# Patient Record
Sex: MALE | Race: WHITE | Employment: FULL TIME | URBAN - METROPOLITAN AREA
[De-identification: names, ages, dates, MRNs, and addresses within clinical notes are randomized per-mention and may not be internally consistent; named-entity substitution may affect disease eponyms.]

---

## 2024-11-07 ENCOUNTER — APPOINTMENT (OUTPATIENT)
Dept: CT IMAGING | Age: 29
End: 2024-11-07
Payer: COMMERCIAL

## 2024-11-07 ENCOUNTER — HOSPITAL ENCOUNTER (INPATIENT)
Age: 29
LOS: 8 days | Discharge: NURSING, REHAB OR LONG-TERM CARE | End: 2024-11-15
Attending: STUDENT IN AN ORGANIZED HEALTH CARE EDUCATION/TRAINING PROGRAM | Admitting: FAMILY MEDICINE
Payer: COMMERCIAL

## 2024-11-07 DIAGNOSIS — R56.9 SEIZURE (HCC): Primary | ICD-10-CM

## 2024-11-07 DIAGNOSIS — F10.939 ALCOHOL WITHDRAWAL SYNDROME WITH COMPLICATION (HCC): ICD-10-CM

## 2024-11-07 LAB
ALBUMIN SERPL-MCNC: 4.2 G/DL (ref 3.4–5)
ALBUMIN/GLOB SERPL: 1.6 {RATIO} (ref 1.1–2.2)
ALP SERPL-CCNC: 79 U/L (ref 40–129)
ALT SERPL-CCNC: 59 U/L (ref 10–40)
ANION GAP SERPL CALCULATED.3IONS-SCNC: 23 MMOL/L (ref 3–16)
APAP SERPL-MCNC: <5 UG/ML (ref 10–30)
AST SERPL-CCNC: 174 U/L (ref 15–37)
BASE EXCESS BLDV CALC-SCNC: -14.1 MMOL/L (ref -3–3)
BASOPHILS # BLD: 0 K/UL (ref 0–0.2)
BASOPHILS NFR BLD: 0.9 %
BILIRUB SERPL-MCNC: 0.7 MG/DL (ref 0–1)
BUN SERPL-MCNC: 7 MG/DL (ref 7–20)
CALCIUM SERPL-MCNC: 8.7 MG/DL (ref 8.3–10.6)
CHLORIDE SERPL-SCNC: 92 MMOL/L (ref 99–110)
CK SERPL-CCNC: 127 U/L (ref 39–308)
CO2 BLDV-SCNC: 11 MMOL/L
CO2 SERPL-SCNC: 19 MMOL/L (ref 21–32)
COHGB MFR BLDV: 1.3 % (ref 0–1.5)
CREAT SERPL-MCNC: 0.5 MG/DL (ref 0.9–1.3)
DEPRECATED RDW RBC AUTO: 14.4 % (ref 12.4–15.4)
EOSINOPHIL # BLD: 0 K/UL (ref 0–0.6)
EOSINOPHIL NFR BLD: 0.4 %
ETHANOLAMINE SERPL-MCNC: 43 MG/DL (ref 0–0.08)
GFR SERPLBLD CREATININE-BSD FMLA CKD-EPI: >90 ML/MIN/{1.73_M2}
GLUCOSE SERPL-MCNC: 120 MG/DL (ref 70–99)
HCO3 BLDV-SCNC: 10.7 MMOL/L (ref 23–29)
HCT VFR BLD AUTO: 36.9 % (ref 40.5–52.5)
HGB BLD-MCNC: 12.3 G/DL (ref 13.5–17.5)
LYMPHOCYTES # BLD: 0.8 K/UL (ref 1–5.1)
LYMPHOCYTES NFR BLD: 16.2 %
MAGNESIUM SERPL-MCNC: 1.45 MG/DL (ref 1.8–2.4)
MCH RBC QN AUTO: 30 PG (ref 26–34)
MCHC RBC AUTO-ENTMCNC: 33.4 G/DL (ref 31–36)
MCV RBC AUTO: 89.8 FL (ref 80–100)
METHGB MFR BLDV: 0.3 %
MONOCYTES # BLD: 0.5 K/UL (ref 0–1.3)
MONOCYTES NFR BLD: 9.6 %
NEUTROPHILS # BLD: 3.4 K/UL (ref 1.7–7.7)
NEUTROPHILS NFR BLD: 72.9 %
O2 CT VFR BLDV CALC: 19 VOL %
O2 THERAPY: ABNORMAL
PATH INTERP BLD-IMP: NO
PCO2 BLDV: 23.4 MMHG (ref 40–50)
PH BLDV: 7.28 [PH] (ref 7.35–7.45)
PLATELET # BLD AUTO: 73 K/UL (ref 135–450)
PLATELET BLD QL SMEAR: ABNORMAL
PMV BLD AUTO: 8.6 FL (ref 5–10.5)
PO2 BLDV: 91.3 MMHG (ref 25–40)
POTASSIUM SERPL-SCNC: 3 MMOL/L (ref 3.5–5.1)
PROT SERPL-MCNC: 6.9 G/DL (ref 6.4–8.2)
RBC # BLD AUTO: 4.12 M/UL (ref 4.2–5.9)
SALICYLATES SERPL-MCNC: 0.5 MG/DL (ref 15–30)
SAO2 % BLDV: 96 %
SLIDE REVIEW: ABNORMAL
SODIUM SERPL-SCNC: 134 MMOL/L (ref 136–145)
TSH SERPL DL<=0.005 MIU/L-ACNC: 0.9 UIU/ML (ref 0.27–4.2)
WBC # BLD AUTO: 4.7 K/UL (ref 4–11)

## 2024-11-07 PROCEDURE — 96375 TX/PRO/DX INJ NEW DRUG ADDON: CPT

## 2024-11-07 PROCEDURE — 82803 BLOOD GASES ANY COMBINATION: CPT

## 2024-11-07 PROCEDURE — 96365 THER/PROPH/DIAG IV INF INIT: CPT

## 2024-11-07 PROCEDURE — 6360000002 HC RX W HCPCS

## 2024-11-07 PROCEDURE — 70450 CT HEAD/BRAIN W/O DYE: CPT

## 2024-11-07 PROCEDURE — 82077 ASSAY SPEC XCP UR&BREATH IA: CPT

## 2024-11-07 PROCEDURE — 85025 COMPLETE CBC W/AUTO DIFF WBC: CPT

## 2024-11-07 PROCEDURE — 99285 EMERGENCY DEPT VISIT HI MDM: CPT

## 2024-11-07 PROCEDURE — 2580000003 HC RX 258: Performed by: STUDENT IN AN ORGANIZED HEALTH CARE EDUCATION/TRAINING PROGRAM

## 2024-11-07 PROCEDURE — 93005 ELECTROCARDIOGRAM TRACING: CPT | Performed by: STUDENT IN AN ORGANIZED HEALTH CARE EDUCATION/TRAINING PROGRAM

## 2024-11-07 PROCEDURE — 2000000000 HC ICU R&B

## 2024-11-07 PROCEDURE — 80143 DRUG ASSAY ACETAMINOPHEN: CPT

## 2024-11-07 PROCEDURE — 80179 DRUG ASSAY SALICYLATE: CPT

## 2024-11-07 PROCEDURE — 36415 COLL VENOUS BLD VENIPUNCTURE: CPT

## 2024-11-07 PROCEDURE — 80053 COMPREHEN METABOLIC PANEL: CPT

## 2024-11-07 PROCEDURE — 84443 ASSAY THYROID STIM HORMONE: CPT

## 2024-11-07 PROCEDURE — 82550 ASSAY OF CK (CPK): CPT

## 2024-11-07 PROCEDURE — 6360000002 HC RX W HCPCS: Performed by: STUDENT IN AN ORGANIZED HEALTH CARE EDUCATION/TRAINING PROGRAM

## 2024-11-07 PROCEDURE — 83735 ASSAY OF MAGNESIUM: CPT

## 2024-11-07 RX ORDER — POTASSIUM CHLORIDE 7.45 MG/ML
10 INJECTION INTRAVENOUS
Status: DISCONTINUED | OUTPATIENT
Start: 2024-11-07 | End: 2024-11-07

## 2024-11-07 RX ORDER — THIAMINE HYDROCHLORIDE 100 MG/ML
100 INJECTION, SOLUTION INTRAMUSCULAR; INTRAVENOUS ONCE
Status: COMPLETED | OUTPATIENT
Start: 2024-11-07 | End: 2024-11-07

## 2024-11-07 RX ORDER — POTASSIUM CHLORIDE 7.45 MG/ML
10 INJECTION INTRAVENOUS
Status: DISPENSED | OUTPATIENT
Start: 2024-11-07 | End: 2024-11-08

## 2024-11-07 RX ORDER — MAGNESIUM SULFATE IN WATER 40 MG/ML
2000 INJECTION, SOLUTION INTRAVENOUS ONCE
Status: COMPLETED | OUTPATIENT
Start: 2024-11-07 | End: 2024-11-08

## 2024-11-07 RX ORDER — PHENOBARBITAL SODIUM 65 MG/ML
260 INJECTION, SOLUTION INTRAMUSCULAR; INTRAVENOUS ONCE
Status: COMPLETED | OUTPATIENT
Start: 2024-11-07 | End: 2024-11-07

## 2024-11-07 RX ORDER — LORAZEPAM 2 MG/ML
2 INJECTION INTRAMUSCULAR ONCE
Status: COMPLETED | OUTPATIENT
Start: 2024-11-07 | End: 2024-11-07

## 2024-11-07 RX ORDER — LORAZEPAM 2 MG/ML
INJECTION INTRAMUSCULAR
Status: COMPLETED
Start: 2024-11-07 | End: 2024-11-07

## 2024-11-07 RX ADMIN — MAGNESIUM SULFATE HEPTAHYDRATE 2000 MG: 40 INJECTION, SOLUTION INTRAVENOUS at 22:08

## 2024-11-07 RX ADMIN — POTASSIUM CHLORIDE 10 MEQ: 29.8 INJECTION INTRAVENOUS at 23:20

## 2024-11-07 RX ADMIN — PHENOBARBITAL SODIUM 260 MG: 65 INJECTION INTRAMUSCULAR; INTRAVENOUS at 23:12

## 2024-11-07 RX ADMIN — LORAZEPAM 2 MG: 2 INJECTION INTRAMUSCULAR at 20:28

## 2024-11-07 RX ADMIN — LORAZEPAM 2 MG: 2 INJECTION INTRAMUSCULAR; INTRAVENOUS at 20:29

## 2024-11-07 RX ADMIN — LORAZEPAM 2 MG: 2 INJECTION INTRAMUSCULAR; INTRAVENOUS at 20:28

## 2024-11-07 RX ADMIN — THIAMINE HYDROCHLORIDE 100 MG: 100 INJECTION, SOLUTION INTRAMUSCULAR; INTRAVENOUS at 20:57

## 2024-11-07 RX ADMIN — LORAZEPAM 2 MG: 2 INJECTION INTRAMUSCULAR at 20:29

## 2024-11-07 ASSESSMENT — LIFESTYLE VARIABLES
HOW OFTEN DO YOU HAVE A DRINK CONTAINING ALCOHOL: 2-4 TIMES A MONTH
HOW MANY STANDARD DRINKS CONTAINING ALCOHOL DO YOU HAVE ON A TYPICAL DAY: 5 OR 6

## 2024-11-07 ASSESSMENT — PAIN - FUNCTIONAL ASSESSMENT: PAIN_FUNCTIONAL_ASSESSMENT: NONE - DENIES PAIN

## 2024-11-08 PROBLEM — F10.939 ALCOHOL WITHDRAWAL SYNDROME WITH COMPLICATION (HCC): Status: ACTIVE | Noted: 2024-11-08

## 2024-11-08 PROBLEM — D69.6 THROMBOCYTOPENIA (HCC): Status: ACTIVE | Noted: 2024-11-08

## 2024-11-08 PROBLEM — R79.89 ELEVATED LFTS: Status: ACTIVE | Noted: 2024-11-08

## 2024-11-08 LAB
AMPHETAMINES UR QL SCN>1000 NG/ML: NORMAL
ANION GAP SERPL CALCULATED.3IONS-SCNC: 15 MMOL/L (ref 3–16)
BARBITURATES UR QL SCN>200 NG/ML: NORMAL
BASOPHILS # BLD: 0 K/UL (ref 0–0.2)
BASOPHILS NFR BLD: 0.3 %
BENZODIAZ UR QL SCN>200 NG/ML: NORMAL
BUN SERPL-MCNC: 7 MG/DL (ref 7–20)
CALCIUM SERPL-MCNC: 8.2 MG/DL (ref 8.3–10.6)
CANNABINOIDS UR QL SCN>50 NG/ML: NORMAL
CHLORIDE SERPL-SCNC: 92 MMOL/L (ref 99–110)
CO2 SERPL-SCNC: 24 MMOL/L (ref 21–32)
COCAINE UR QL SCN: NORMAL
CREAT SERPL-MCNC: 0.4 MG/DL (ref 0.9–1.3)
DEPRECATED RDW RBC AUTO: 14.5 % (ref 12.4–15.4)
DRUG SCREEN COMMENT UR-IMP: NORMAL
EKG DIAGNOSIS: NORMAL
EKG Q-T INTERVAL: 282 MS
EKG QRS DURATION: 80 MS
EKG QTC CALCULATION (BAZETT): 411 MS
EKG R AXIS: 6 DEGREES
EKG T AXIS: 2 DEGREES
EKG VENTRICULAR RATE: 128 BPM
EOSINOPHIL # BLD: 0 K/UL (ref 0–0.6)
EOSINOPHIL NFR BLD: 0 %
FENTANYL SCREEN, URINE: NORMAL
GFR SERPLBLD CREATININE-BSD FMLA CKD-EPI: >90 ML/MIN/{1.73_M2}
GLUCOSE SERPL-MCNC: 83 MG/DL (ref 70–99)
HCT VFR BLD AUTO: 37.1 % (ref 40.5–52.5)
HGB BLD-MCNC: 12.6 G/DL (ref 13.5–17.5)
LYMPHOCYTES # BLD: 0.3 K/UL (ref 1–5.1)
LYMPHOCYTES NFR BLD: 7.3 %
MCH RBC QN AUTO: 30.1 PG (ref 26–34)
MCHC RBC AUTO-ENTMCNC: 34 G/DL (ref 31–36)
MCV RBC AUTO: 88.4 FL (ref 80–100)
METHADONE UR QL SCN>300 NG/ML: NORMAL
MONOCYTES # BLD: 0.4 K/UL (ref 0–1.3)
MONOCYTES NFR BLD: 8.7 %
NEUTROPHILS # BLD: 3.9 K/UL (ref 1.7–7.7)
NEUTROPHILS NFR BLD: 83.7 %
OPIATES UR QL SCN>300 NG/ML: NORMAL
OXYCODONE UR QL SCN: NORMAL
PCP UR QL SCN>25 NG/ML: NORMAL
PH UR STRIP: 5.5 [PH]
PLATELET # BLD AUTO: 56 K/UL (ref 135–450)
PMV BLD AUTO: 8.3 FL (ref 5–10.5)
POTASSIUM SERPL-SCNC: 3.3 MMOL/L (ref 3.5–5.1)
RBC # BLD AUTO: 4.2 M/UL (ref 4.2–5.9)
SODIUM SERPL-SCNC: 131 MMOL/L (ref 136–145)
WBC # BLD AUTO: 4.7 K/UL (ref 4–11)

## 2024-11-08 PROCEDURE — 6360000002 HC RX W HCPCS: Performed by: STUDENT IN AN ORGANIZED HEALTH CARE EDUCATION/TRAINING PROGRAM

## 2024-11-08 PROCEDURE — 6360000002 HC RX W HCPCS: Performed by: FAMILY MEDICINE

## 2024-11-08 PROCEDURE — 36415 COLL VENOUS BLD VENIPUNCTURE: CPT

## 2024-11-08 PROCEDURE — 95816 EEG AWAKE AND DROWSY: CPT

## 2024-11-08 PROCEDURE — 6370000000 HC RX 637 (ALT 250 FOR IP): Performed by: INTERNAL MEDICINE

## 2024-11-08 PROCEDURE — 2580000003 HC RX 258: Performed by: FAMILY MEDICINE

## 2024-11-08 PROCEDURE — 80048 BASIC METABOLIC PNL TOTAL CA: CPT

## 2024-11-08 PROCEDURE — 99223 1ST HOSP IP/OBS HIGH 75: CPT | Performed by: INTERNAL MEDICINE

## 2024-11-08 PROCEDURE — 2580000003 HC RX 258: Performed by: STUDENT IN AN ORGANIZED HEALTH CARE EDUCATION/TRAINING PROGRAM

## 2024-11-08 PROCEDURE — 6370000000 HC RX 637 (ALT 250 FOR IP): Performed by: FAMILY MEDICINE

## 2024-11-08 PROCEDURE — 99223 1ST HOSP IP/OBS HIGH 75: CPT | Performed by: PSYCHIATRY & NEUROLOGY

## 2024-11-08 PROCEDURE — 80307 DRUG TEST PRSMV CHEM ANLYZR: CPT

## 2024-11-08 PROCEDURE — 6370000000 HC RX 637 (ALT 250 FOR IP): Performed by: PSYCHIATRY & NEUROLOGY

## 2024-11-08 PROCEDURE — 85025 COMPLETE CBC W/AUTO DIFF WBC: CPT

## 2024-11-08 PROCEDURE — 93010 ELECTROCARDIOGRAM REPORT: CPT | Performed by: INTERNAL MEDICINE

## 2024-11-08 PROCEDURE — 2000000000 HC ICU R&B

## 2024-11-08 RX ORDER — SODIUM CHLORIDE 0.9 % (FLUSH) 0.9 %
5-40 SYRINGE (ML) INJECTION PRN
Status: DISCONTINUED | OUTPATIENT
Start: 2024-11-08 | End: 2024-11-08 | Stop reason: SDUPTHER

## 2024-11-08 RX ORDER — MUPIROCIN 20 MG/G
OINTMENT TOPICAL 2 TIMES DAILY
Status: DISCONTINUED | OUTPATIENT
Start: 2024-11-08 | End: 2024-11-13

## 2024-11-08 RX ORDER — ONDANSETRON 2 MG/ML
4 INJECTION INTRAMUSCULAR; INTRAVENOUS EVERY 6 HOURS PRN
Status: DISCONTINUED | OUTPATIENT
Start: 2024-11-08 | End: 2024-11-15 | Stop reason: HOSPADM

## 2024-11-08 RX ORDER — POTASSIUM CHLORIDE 29.8 MG/ML
20 INJECTION INTRAVENOUS PRN
Status: DISCONTINUED | OUTPATIENT
Start: 2024-11-08 | End: 2024-11-15 | Stop reason: HOSPADM

## 2024-11-08 RX ORDER — LORAZEPAM 2 MG/ML
4 INJECTION INTRAMUSCULAR
Status: DISCONTINUED | OUTPATIENT
Start: 2024-11-08 | End: 2024-11-15 | Stop reason: HOSPADM

## 2024-11-08 RX ORDER — ACETAMINOPHEN 325 MG/1
650 TABLET ORAL EVERY 6 HOURS PRN
Status: DISCONTINUED | OUTPATIENT
Start: 2024-11-08 | End: 2024-11-15 | Stop reason: HOSPADM

## 2024-11-08 RX ORDER — CHLORDIAZEPOXIDE HYDROCHLORIDE 25 MG/1
50 CAPSULE, GELATIN COATED ORAL EVERY 6 HOURS
Status: DISCONTINUED | OUTPATIENT
Start: 2024-11-09 | End: 2024-11-13

## 2024-11-08 RX ORDER — POTASSIUM CHLORIDE 7.45 MG/ML
10 INJECTION INTRAVENOUS PRN
Status: DISCONTINUED | OUTPATIENT
Start: 2024-11-08 | End: 2024-11-15 | Stop reason: HOSPADM

## 2024-11-08 RX ORDER — SODIUM CHLORIDE 0.9 % (FLUSH) 0.9 %
5-40 SYRINGE (ML) INJECTION EVERY 12 HOURS SCHEDULED
Status: DISCONTINUED | OUTPATIENT
Start: 2024-11-08 | End: 2024-11-08 | Stop reason: SDUPTHER

## 2024-11-08 RX ORDER — LANOLIN ALCOHOL/MO/W.PET/CERES
100 CREAM (GRAM) TOPICAL DAILY
Status: DISCONTINUED | OUTPATIENT
Start: 2024-11-08 | End: 2024-11-15 | Stop reason: HOSPADM

## 2024-11-08 RX ORDER — POTASSIUM CHLORIDE 750 MG/1
40 TABLET, EXTENDED RELEASE ORAL ONCE
Status: COMPLETED | OUTPATIENT
Start: 2024-11-08 | End: 2024-11-08

## 2024-11-08 RX ORDER — MAGNESIUM SULFATE IN WATER 40 MG/ML
2000 INJECTION, SOLUTION INTRAVENOUS PRN
Status: DISCONTINUED | OUTPATIENT
Start: 2024-11-08 | End: 2024-11-15 | Stop reason: HOSPADM

## 2024-11-08 RX ORDER — FOLIC ACID 1 MG/1
1 TABLET ORAL DAILY
Status: DISCONTINUED | OUTPATIENT
Start: 2024-11-08 | End: 2024-11-15 | Stop reason: HOSPADM

## 2024-11-08 RX ORDER — ACETAMINOPHEN 650 MG/1
650 SUPPOSITORY RECTAL EVERY 6 HOURS PRN
Status: DISCONTINUED | OUTPATIENT
Start: 2024-11-08 | End: 2024-11-15 | Stop reason: HOSPADM

## 2024-11-08 RX ORDER — SODIUM CHLORIDE 9 MG/ML
INJECTION, SOLUTION INTRAVENOUS CONTINUOUS
Status: DISCONTINUED | OUTPATIENT
Start: 2024-11-08 | End: 2024-11-08

## 2024-11-08 RX ORDER — MORPHINE SULFATE 2 MG/ML
2 INJECTION, SOLUTION INTRAMUSCULAR; INTRAVENOUS
Status: DISCONTINUED | OUTPATIENT
Start: 2024-11-08 | End: 2024-11-15 | Stop reason: HOSPADM

## 2024-11-08 RX ORDER — PREGABALIN 25 MG/1
75 CAPSULE ORAL 2 TIMES DAILY
Status: DISCONTINUED | OUTPATIENT
Start: 2024-11-08 | End: 2024-11-15 | Stop reason: HOSPADM

## 2024-11-08 RX ORDER — M-VIT,TX,IRON,MINS/CALC/FOLIC 27MG-0.4MG
1 TABLET ORAL DAILY
Status: DISCONTINUED | OUTPATIENT
Start: 2024-11-08 | End: 2024-11-15 | Stop reason: HOSPADM

## 2024-11-08 RX ORDER — SODIUM CHLORIDE 0.9 % (FLUSH) 0.9 %
5-40 SYRINGE (ML) INJECTION PRN
Status: DISCONTINUED | OUTPATIENT
Start: 2024-11-08 | End: 2024-11-15 | Stop reason: HOSPADM

## 2024-11-08 RX ORDER — LORAZEPAM 2 MG/ML
3 INJECTION INTRAMUSCULAR
Status: DISCONTINUED | OUTPATIENT
Start: 2024-11-08 | End: 2024-11-15 | Stop reason: HOSPADM

## 2024-11-08 RX ORDER — LORAZEPAM 1 MG/1
1 TABLET ORAL
Status: DISCONTINUED | OUTPATIENT
Start: 2024-11-08 | End: 2024-11-15 | Stop reason: HOSPADM

## 2024-11-08 RX ORDER — LORAZEPAM 2 MG/1
2 TABLET ORAL
Status: DISCONTINUED | OUTPATIENT
Start: 2024-11-08 | End: 2024-11-15 | Stop reason: HOSPADM

## 2024-11-08 RX ORDER — SODIUM CHLORIDE 0.9 % (FLUSH) 0.9 %
5-40 SYRINGE (ML) INJECTION EVERY 12 HOURS SCHEDULED
Status: DISCONTINUED | OUTPATIENT
Start: 2024-11-08 | End: 2024-11-15 | Stop reason: HOSPADM

## 2024-11-08 RX ORDER — LORAZEPAM 2 MG/1
4 TABLET ORAL
Status: DISCONTINUED | OUTPATIENT
Start: 2024-11-08 | End: 2024-11-15 | Stop reason: HOSPADM

## 2024-11-08 RX ORDER — ONDANSETRON 4 MG/1
4 TABLET, ORALLY DISINTEGRATING ORAL EVERY 8 HOURS PRN
Status: DISCONTINUED | OUTPATIENT
Start: 2024-11-08 | End: 2024-11-15 | Stop reason: HOSPADM

## 2024-11-08 RX ORDER — SODIUM CHLORIDE 9 MG/ML
INJECTION, SOLUTION INTRAVENOUS PRN
Status: DISCONTINUED | OUTPATIENT
Start: 2024-11-08 | End: 2024-11-08 | Stop reason: SDUPTHER

## 2024-11-08 RX ORDER — LORAZEPAM 2 MG/ML
2 INJECTION INTRAMUSCULAR
Status: DISCONTINUED | OUTPATIENT
Start: 2024-11-08 | End: 2024-11-15 | Stop reason: HOSPADM

## 2024-11-08 RX ORDER — CHLORDIAZEPOXIDE HYDROCHLORIDE 25 MG/1
25 CAPSULE, GELATIN COATED ORAL EVERY 6 HOURS
Status: DISCONTINUED | OUTPATIENT
Start: 2024-11-08 | End: 2024-11-08

## 2024-11-08 RX ORDER — SODIUM CHLORIDE 9 MG/ML
INJECTION, SOLUTION INTRAVENOUS PRN
Status: DISCONTINUED | OUTPATIENT
Start: 2024-11-08 | End: 2024-11-15 | Stop reason: HOSPADM

## 2024-11-08 RX ORDER — POLYETHYLENE GLYCOL 3350 17 G/17G
17 POWDER, FOR SOLUTION ORAL DAILY PRN
Status: DISCONTINUED | OUTPATIENT
Start: 2024-11-08 | End: 2024-11-15 | Stop reason: HOSPADM

## 2024-11-08 RX ORDER — LORAZEPAM 2 MG/ML
1 INJECTION INTRAMUSCULAR
Status: DISCONTINUED | OUTPATIENT
Start: 2024-11-08 | End: 2024-11-15 | Stop reason: HOSPADM

## 2024-11-08 RX ORDER — ENOXAPARIN SODIUM 100 MG/ML
40 INJECTION SUBCUTANEOUS DAILY
Status: DISCONTINUED | OUTPATIENT
Start: 2024-11-08 | End: 2024-11-11

## 2024-11-08 RX ADMIN — Medication 100 MG: at 07:48

## 2024-11-08 RX ADMIN — SODIUM CHLORIDE: 9 INJECTION, SOLUTION INTRAVENOUS at 01:27

## 2024-11-08 RX ADMIN — PREGABALIN 75 MG: 25 CAPSULE ORAL at 19:46

## 2024-11-08 RX ADMIN — MUPIROCIN: 20 OINTMENT TOPICAL at 19:46

## 2024-11-08 RX ADMIN — MORPHINE SULFATE 2 MG: 2 INJECTION, SOLUTION INTRAMUSCULAR; INTRAVENOUS at 22:46

## 2024-11-08 RX ADMIN — PREGABALIN 75 MG: 25 CAPSULE ORAL at 12:42

## 2024-11-08 RX ADMIN — MUPIROCIN: 20 OINTMENT TOPICAL at 11:09

## 2024-11-08 RX ADMIN — CHLORDIAZEPOXIDE HYDROCHLORIDE 25 MG: 25 CAPSULE ORAL at 09:18

## 2024-11-08 RX ADMIN — Medication 1 TABLET: at 10:56

## 2024-11-08 RX ADMIN — LORAZEPAM 2 MG: 2 INJECTION, SOLUTION INTRAMUSCULAR; INTRAVENOUS at 02:24

## 2024-11-08 RX ADMIN — LORAZEPAM 2 MG: 2 INJECTION, SOLUTION INTRAMUSCULAR; INTRAVENOUS at 04:53

## 2024-11-08 RX ADMIN — CHLORDIAZEPOXIDE HYDROCHLORIDE 25 MG: 25 CAPSULE ORAL at 15:43

## 2024-11-08 RX ADMIN — LORAZEPAM 3 MG: 2 INJECTION, SOLUTION INTRAMUSCULAR; INTRAVENOUS at 12:42

## 2024-11-08 RX ADMIN — POTASSIUM CHLORIDE 10 MEQ: 29.8 INJECTION INTRAVENOUS at 01:01

## 2024-11-08 RX ADMIN — LORAZEPAM 3 MG: 2 INJECTION, SOLUTION INTRAMUSCULAR; INTRAVENOUS at 16:50

## 2024-11-08 RX ADMIN — LORAZEPAM 3 MG: 2 INJECTION, SOLUTION INTRAMUSCULAR; INTRAVENOUS at 09:18

## 2024-11-08 RX ADMIN — POTASSIUM CHLORIDE 40 MEQ: 750 TABLET, EXTENDED RELEASE ORAL at 10:56

## 2024-11-08 RX ADMIN — LORAZEPAM 1 MG: 2 INJECTION, SOLUTION INTRAMUSCULAR; INTRAVENOUS at 11:06

## 2024-11-08 RX ADMIN — LORAZEPAM 1 MG: 1 TABLET ORAL at 07:52

## 2024-11-08 RX ADMIN — Medication 10 ML: at 19:48

## 2024-11-08 RX ADMIN — LORAZEPAM 3 MG: 2 INJECTION, SOLUTION INTRAMUSCULAR; INTRAVENOUS at 20:49

## 2024-11-08 RX ADMIN — FOLIC ACID 1 MG: 1 TABLET ORAL at 10:56

## 2024-11-08 RX ADMIN — CHLORDIAZEPOXIDE HYDROCHLORIDE 25 MG: 25 CAPSULE ORAL at 19:46

## 2024-11-08 ASSESSMENT — PAIN DESCRIPTION - LOCATION: LOCATION: GENERALIZED

## 2024-11-08 ASSESSMENT — PAIN SCALES - GENERAL
PAINLEVEL_OUTOF10: 0
PAINLEVEL_OUTOF10: 4

## 2024-11-08 ASSESSMENT — LIFESTYLE VARIABLES
HOW OFTEN DO YOU HAVE A DRINK CONTAINING ALCOHOL: 4 OR MORE TIMES A WEEK
HOW MANY STANDARD DRINKS CONTAINING ALCOHOL DO YOU HAVE ON A TYPICAL DAY: 5 OR 6

## 2024-11-08 ASSESSMENT — PAIN SCALES - WONG BAKER: WONGBAKER_NUMERICALRESPONSE: NO HURT

## 2024-11-08 ASSESSMENT — PAIN DESCRIPTION - DESCRIPTORS: DESCRIPTORS: ACHING

## 2024-11-08 NOTE — PROGRESS NOTES
Blood pressure (!) 142/102, pulse 95, temperature 99.9 °F (37.7 °C), temperature source Oral, resp. rate 20, height 1.854 m (6' 1\"), weight 68 kg (150 lb), SpO2 100%.    Patient admitted to ICU for Seizures due to alcohol withdraw.    Telemetry monitor hooked up to patient.  ST on monitor.    Breathing pattern appears normal. Lung sounds show clear.   Admitted to ICU on 11-8-24.    Mental status appears confused at times. BUE strength is normal. BLE strength is noral.    Abdomen appears soft non-tender. Bowels active x 4.     IV access is peripheral.    Urinary malewick.    0 edema 0.     CHG bath provided for patient.

## 2024-11-08 NOTE — PLAN OF CARE
Problem: Discharge Planning  Goal: Discharge to home or other facility with appropriate resources  Outcome: Progressing  Flowsheets (Taken 11/8/2024 0112)  Discharge to home or other facility with appropriate resources:   Identify barriers to discharge with patient and caregiver   Arrange for needed discharge resources and transportation as appropriate   Identify discharge learning needs (meds, wound care, etc)

## 2024-11-08 NOTE — CONSULTS
Neurology consultation note    Patient name: Fernando Abdullahi      Chief Complaint:  New onset of seizure.    History of present illness:  This is a 29 years old right-handed male.  The patient was brought to the hospital overnight after the patient developed generalized convulsion at home.  The patient had 2 more episodes in the ER.  The patient is still not a good historian.  So history is obtained from medical record reviews.  The patient has history of alcohol abuse.  Per the patient, he has at least 3 to 4 days of bourbon per week.  The patient denies history of CVA or seizure disorder.  The patient also denied history of traumatic brain injury.  CT brain showed no acute ischemic injury.    Past medical history:    History reviewed. No pertinent past medical history.    Past surgical history:    History reviewed. No pertinent surgical history.     Medication:    Current Facility-Administered Medications   Medication Dose Route Frequency Provider Last Rate Last Admin    sodium chloride flush 0.9 % injection 5-40 mL  5-40 mL IntraVENous 2 times per day Hailee Gordillo MD        sodium chloride flush 0.9 % injection 5-40 mL  5-40 mL IntraVENous PRN Hailee Gordillo MD        0.9 % sodium chloride infusion   IntraVENous PRN Hailee Gordillo MD        potassium chloride 20 mEq/50 mL IVPB (Central Line)  20 mEq IntraVENous PRN Hailee Gordillo MD        Or    potassium chloride 10 mEq/100 mL IVPB (Peripheral Line)  10 mEq IntraVENous PRN Hailee Gordillo MD        magnesium sulfate 2000 mg in 50 mL IVPB premix  2,000 mg IntraVENous PRN Hailee Gordillo MD        [Held by provider] enoxaparin (LOVENOX) injection 40 mg  40 mg SubCUTAneous Daily Hailee Gordillo MD        ondansetron (ZOFRAN-ODT) disintegrating tablet 4 mg  4 mg Oral Q8H PRN Hailee Gordillo MD        Or    ondansetron (ZOFRAN) injection 4 mg  4 mg IntraVENous Q6H PRN Hailee Gordillo MD        polyethylene glycol  of system:  No chest pain, shortness of breath, palpitation, cough, fever, abdominal pain, vomiting, diarrhea, dysuria, vertigo, joint pain, change in speech/vision or new onset of weakness/numbness. Remaining as per HPI.      BP (!) 146/118   Pulse 99   Temp 99.9 °F (37.7 °C) (Oral)   Resp 13   Ht 1.854 m (6' 1\")   Wt 68 kg (150 lb)   SpO2 100%   BMI 19.79 kg/m²     Neurological examination:  MENTAL STATUS:  Alert and oriented to person.  LANG/SPEECH: No dysarthria.  CRANIAL NERVES: No facial asymmetry.  MOTOR: No pronator drift or leg drift.  REFLEXES: Generalized 2+.  SENSORY: Grossly intact.  COORD: Mild degree of action tremor.    Imaging, procedure, and laboratory data:   I reviewed the CT brain and labs.    Assessment:    Principal Problem:    Seizure (HCC)  Active Problems:    Alcohol withdrawal syndrome with complication (HCC)    Thrombocytopenia (HCC)    Elevated LFTs  Resolved Problems:    * No resolved hospital problems. *     The patient remains in the mild degree of ataxia and tremor.  CT brain showed no acute pathology.  Positive EtOH level.  Negative seizure.  Elevated AST and ALT.    From neurology perspective, this can be alcohol related seizure.  Based on the seizure prophylaxis, the patient needs short-term antiepileptic medication.    Plan:    Pregabalin 75 mg BID.  Seizure precaution.  Ativan 2 mg as needed for breakthrough seizure.  Agree with thiamine.  EEG.        75 minutes were spent with the patient with greater than 50% of the time spent in counseling and coordination of care.    Sahara Crespo MD

## 2024-11-08 NOTE — ED NOTES
while I was talking to the Pt, he had seizues, tonic clonic with upward rolling of eye balls, placed Pt on his side and called for help.

## 2024-11-08 NOTE — PROGRESS NOTES
Clinical called with admission at this time for bed 9.  Electronically signed by Kathy Myers RN on 11/8/2024 at 12:21 AM

## 2024-11-08 NOTE — CARE COORDINATION
Met with pt bedside, pt sleeping spoke with parents. 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 They were aware of his drinking while pt lived at their home but when they would ask about it, pt would become defensive. Mother is a RN and has been speaking with her social workers at her office to find treatment facilities for her son since they are [place]. Parents understand that pt will have to be agreeable to going to treatment and they are hoping he will choose this. Advised if they need CM to assist with anything to let us know. They hope to get a list of facilities near them and they will be calling to see about admission once pt is medically stable. Father states that pt will not be returning home alone and will be having someone stay with him around the clock, if pt is agreeable. CM following.

## 2024-11-08 NOTE — H&P
History and Physical        HISTORY OF PRESENT ILLNESS: A 29-year-old male with a history of alcohol abuse presented to the emergency room due to an observed seizure at home.  He had 2 more seizures while in the emergency room.  Given IV Ativan.  Admitted to the ICU.  Has not had any further seizures    Patient is allergic to naproxen.    History reviewed. No pertinent past medical history.    History reviewed. No pertinent surgical history.    Scheduled Meds:   sodium chloride flush  5-40 mL IntraVENous 2 times per day    [Held by provider] enoxaparin  40 mg SubCUTAneous Daily    thiamine  100 mg Oral Daily    mupirocin   Each Nostril BID    chlordiazePOXIDE  25 mg Oral Q6H    folic acid  1 mg Oral Daily    therapeutic multivitamin-minerals  1 tablet Oral Daily       Continuous Infusions:   sodium chloride         PRN Meds:  sodium chloride flush, sodium chloride, potassium chloride **OR** potassium chloride, magnesium sulfate, ondansetron **OR** ondansetron, polyethylene glycol, acetaminophen **OR** acetaminophen, LORazepam **OR** LORazepam **OR** LORazepam **OR** LORazepam **OR** LORazepam **OR** LORazepam **OR** LORazepam **OR** LORazepam       has no history on file for tobacco use.    History reviewed. No pertinent family history.    Social History     Socioeconomic History    Marital status: Single     Spouse name: None    Number of children: None    Years of education: None    Highest education level: None   Vaping Use    Vaping status: Every Day    Substances: Nicotine   Substance and Sexual Activity    Alcohol use: Yes     Alcohol/week: 6.0 standard drinks of alcohol     Types: 6 Shots of liquor per week    Drug use: Not Currently     Social Determinants of Health     Food Insecurity: No Food Insecurity (11/8/2024)    Hunger Vital Sign     Worried About Running Out of Food in the Last Year: Never true     Ran Out of Food in the Last Year: Never true   Transportation Needs: No Transportation Needs

## 2024-11-08 NOTE — PROGRESS NOTES
4 Eyes Skin Assessment     NAME:  Fernando Abdullahi  YOB: 1995  MEDICAL RECORD NUMBER:  [id number]    The patient is being assessed for  Admission    I agree that at least one RN has performed a thorough Head to Toe Skin Assessment on the patient. ALL assessment sites listed below have been assessed.      Areas assessed by both nurses:    Head, Face, Ears, Shoulders, Back, Chest, Arms, Elbows, Hands, Sacrum. Buttock, Coccyx, Ischium, Legs. Feet and Heels, and Under Medical Devices   No skin issues noted.       Does the Patient have a Wound? No noted wound(s)       Antonio Prevention initiated by RN: No  Wound Care Orders initiated by RN: No    Pressure Injury (Stage 3,4, Unstageable, DTI, NWPT, and Complex wounds) if present, place Wound referral order by RN under : No    New Ostomies, if present place, Ostomy referral order under : No     Nurse 1 eSignature: Electronically signed by Kathy Myers RN on 11/8/24 at 1:57 AM EST  Patient is not able to demonstrate the ability to move from a reclining position to an upright position within the recliner due to unsteady.    **SHARE this note so that the co-signing nurse can place an eSignature**    Nurse 2 eSignature: Electronically signed by Tate Mcgovern RN on 11/8/24 at 1:58 AM EST

## 2024-11-08 NOTE — CONSULTS
Pulmonary & Critical Care Consultation Note    CC: ETOH w/d and seizure    HISTORY OF PRESENT ILLNESS: 29-year-old male with history of alcohol abuse presented to the emergency room due to observed seizure at home.  He was observed again to have generalized tonic-clonic seizure in the emergency room by the nurse.  He was given Ativan.  He is on CIWA protocol.  He is receiving Ativan about twice this morning  lethargic  At least 6 alcoholic beverages a day and the last was a day and a half ago.    PAST MEDICAL HISTORY:  History reviewed. No pertinent past medical history.  PAST SURGICAL HISTORY:  History reviewed. No pertinent surgical history.    FAMILY HISTORY:  family history is not on file.    SOCIAL HISTORY:   has no history on file for tobacco use.    Scheduled Meds:   sodium chloride flush  5-40 mL IntraVENous 2 times per day    [Held by provider] enoxaparin  40 mg SubCUTAneous Daily    thiamine  100 mg Oral Daily     Continuous Infusions:   sodium chloride      sodium chloride 125 mL/hr at 11/08/24 0500     PRN Meds:  sodium chloride flush, sodium chloride, potassium chloride **OR** potassium chloride, magnesium sulfate, ondansetron **OR** ondansetron, polyethylene glycol, acetaminophen **OR** acetaminophen, LORazepam **OR** LORazepam **OR** LORazepam **OR** LORazepam **OR** LORazepam **OR** LORazepam **OR** LORazepam **OR** LORazepam    ALLERGIES:  Patient is allergic to naproxen.    REVIEW OF SYSTEMS:  Constitutional: Negative for fever  HENT: Negative for sore throat  Eyes: Negative for redness   Respiratory: Negative for dyspnea, cough  Cardiovascular: Negative for chest pain  Gastrointestinal: Negative for vomiting, diarrhea   Genitourinary: Negative for hematuria   Musculoskeletal: Negative for arthralgias   Skin: Negative for rash  Neurological: Negative for syncope; + seizure  Hematological: Negative for adenopathy  Psychiatric/Behavorial: Negative for anxiety    PHYSICAL EXAM:  Blood pressure (!)

## 2024-11-08 NOTE — PROGRESS NOTES
AM assessment completed, see flow sheet. Pt is alert and oriented to all questions, but then gets confused. Vital signs are WNL. Respirations are even & easy on RA. No complaints of pain voiced. Pt telling RN that he only drinks 2-3 times a week, but can not say exactly how much he drinks.  Pt frequently asking \"when is check out time\" and \"I'm ready to go home now\".  RN having to frequently reorient pt.  Pt incontinent around external catheter and had to be cleaned up.  Pt cleaned and then set up for breakfast.  Pt denies further needs at this time. SR up x 2, and bed in low position. Call light is within reach. Bed alarm is on.

## 2024-11-08 NOTE — PROGRESS NOTES
Pts mother and father leaving for the evening.   Mother took pts phone with her to charge it for him.

## 2024-11-08 NOTE — PROGRESS NOTES
Neurology consult sent via perfect serve, Electronically signed by Guanaco Guzman on 11/8/2024 at 7:33 AM

## 2024-11-08 NOTE — CARE COORDINATION
Case Management Assessment  Initial Evaluation    Date/Time of Evaluation: 11/8/2024 8:28 AM  Assessment Completed by: Rosalba Glover    If patient is discharged prior to next notation, then this note serves as note for discharge by case management.    Patient Name: Fernando Abdullahi                   YOB: 1995  Diagnosis: Seizure (HCC) [R56.9]  Alcohol withdrawal syndrome with complication (HCC) [F10.939]                   Date / Time: 11/7/2024  8:00 PM    Patient Admission Status: Inpatient   Readmission Risk (Low < 19, Mod (19-27), High > 27): Readmission Risk Score: 6.9    Current PCP: Slim Kamara MD  PCP verified by CM? Yes ([facility])    Chart Reviewed: Yes      History Provided by: Patient  Patient Orientation: Alert and Oriented    Patient Cognition: Alert    Hospitalization in the last 30 days (Readmission):  No    If yes, Readmission Assessment in  Navigator will be completed.    Advance Directives:      Code Status: Full Code   Patient's Primary Decision Maker is: Patient Declined (Legal Next of Kin Remains as Decision Maker)      Discharge Planning:    Patient lives with: Alone Type of Home: House  Primary Care Giver: Self  Patient Support Systems include: Parent   Current Financial resources: None  Current community resources: None  Current services prior to admission: None            Current DME:              Type of Home Care services:  None    ADLS  Prior functional level: Independent in ADLs/IADLs  Current functional level: Independent in ADLs/IADLs    PT AM-PAC:   /24  OT AM-PAC:   /24    Family can provide assistance at DC: No  Would you like Case Management to discuss the discharge plan with any other family members/significant others, and if so, who? No  Plans to Return to Present Housing: Yes  Other Identified Issues/Barriers to RETURNING to current housing: none  Potential Assistance needed at discharge: N/A            Potential DME:    Patient expects to discharge to:

## 2024-11-08 NOTE — PROCEDURES
INTERPRETATION:  This 25-minute, computer-assisted video EEG recording is abnormal due to excess beta activity.  No potentially epileptiform activity was present during the recording.       The EEG findings were consistent with medication effect.     CLASSIFICATION:  Dysrhythmia grade 1, excess beta activity,  EKG channel.     DESCRIPTION:     BACKGROUND:  The awake recording revealed 9 Hz alpha activity over the posterior head region.  There was intermittent generalized superimposed 11 - 14 Hz beta activity.  The sleep recording contained normal symmetric v-waves, sleep spindles, and K-complexes.  There was no significant change on the EEG background with photic stimulation.  Hyperventilation was omitted due to the patient's condition.       INTERICTAL DISCHARGES: None     CLINICAL EVENTS:  None     The EKG channel was unremarkable.

## 2024-11-09 PROBLEM — F10.930 ALCOHOL WITHDRAWAL SYNDROME WITHOUT COMPLICATION (HCC): Status: ACTIVE | Noted: 2024-11-08

## 2024-11-09 LAB
ANION GAP SERPL CALCULATED.3IONS-SCNC: 14 MMOL/L (ref 3–16)
BASOPHILS # BLD: 0 K/UL (ref 0–0.2)
BASOPHILS NFR BLD: 0.7 %
BUN SERPL-MCNC: 7 MG/DL (ref 7–20)
CALCIUM SERPL-MCNC: 8.9 MG/DL (ref 8.3–10.6)
CHLORIDE SERPL-SCNC: 93 MMOL/L (ref 99–110)
CO2 SERPL-SCNC: 23 MMOL/L (ref 21–32)
CREAT SERPL-MCNC: 0.5 MG/DL (ref 0.9–1.3)
DEPRECATED RDW RBC AUTO: 14.3 % (ref 12.4–15.4)
EOSINOPHIL # BLD: 0 K/UL (ref 0–0.6)
EOSINOPHIL NFR BLD: 1 %
GFR SERPLBLD CREATININE-BSD FMLA CKD-EPI: >90 ML/MIN/{1.73_M2}
GLUCOSE SERPL-MCNC: 74 MG/DL (ref 70–99)
HCT VFR BLD AUTO: 40.9 % (ref 40.5–52.5)
HGB BLD-MCNC: 14 G/DL (ref 13.5–17.5)
LYMPHOCYTES # BLD: 0.6 K/UL (ref 1–5.1)
LYMPHOCYTES NFR BLD: 12.1 %
MCH RBC QN AUTO: 30.4 PG (ref 26–34)
MCHC RBC AUTO-ENTMCNC: 34.2 G/DL (ref 31–36)
MCV RBC AUTO: 89.1 FL (ref 80–100)
MONOCYTES # BLD: 0.4 K/UL (ref 0–1.3)
MONOCYTES NFR BLD: 8.2 %
NEUTROPHILS # BLD: 3.5 K/UL (ref 1.7–7.7)
NEUTROPHILS NFR BLD: 78 %
PLATELET # BLD AUTO: ABNORMAL K/UL (ref 135–450)
PLATELET BLD QL SMEAR: ABNORMAL
PMV BLD AUTO: ABNORMAL FL (ref 5–10.5)
POTASSIUM SERPL-SCNC: 3 MMOL/L (ref 3.5–5.1)
RBC # BLD AUTO: 4.59 M/UL (ref 4.2–5.9)
SLIDE REVIEW: ABNORMAL
SODIUM SERPL-SCNC: 130 MMOL/L (ref 136–145)
WBC # BLD AUTO: 4.5 K/UL (ref 4–11)

## 2024-11-09 PROCEDURE — 6370000000 HC RX 637 (ALT 250 FOR IP): Performed by: FAMILY MEDICINE

## 2024-11-09 PROCEDURE — 6360000002 HC RX W HCPCS: Performed by: FAMILY MEDICINE

## 2024-11-09 PROCEDURE — 36415 COLL VENOUS BLD VENIPUNCTURE: CPT

## 2024-11-09 PROCEDURE — 99291 CRITICAL CARE FIRST HOUR: CPT | Performed by: INTERNAL MEDICINE

## 2024-11-09 PROCEDURE — 99233 SBSQ HOSP IP/OBS HIGH 50: CPT | Performed by: INTERNAL MEDICINE

## 2024-11-09 PROCEDURE — 2500000003 HC RX 250 WO HCPCS: Performed by: INTERNAL MEDICINE

## 2024-11-09 PROCEDURE — 80048 BASIC METABOLIC PNL TOTAL CA: CPT

## 2024-11-09 PROCEDURE — 85025 COMPLETE CBC W/AUTO DIFF WBC: CPT

## 2024-11-09 PROCEDURE — 6370000000 HC RX 637 (ALT 250 FOR IP): Performed by: PSYCHIATRY & NEUROLOGY

## 2024-11-09 PROCEDURE — 2000000000 HC ICU R&B

## 2024-11-09 PROCEDURE — 6370000000 HC RX 637 (ALT 250 FOR IP): Performed by: INTERNAL MEDICINE

## 2024-11-09 RX ORDER — DEXMEDETOMIDINE HYDROCHLORIDE 4 UG/ML
.1-1.5 INJECTION, SOLUTION INTRAVENOUS CONTINUOUS
Status: DISCONTINUED | OUTPATIENT
Start: 2024-11-09 | End: 2024-11-14

## 2024-11-09 RX ADMIN — MAGNESIUM SULFATE HEPTAHYDRATE 2000 MG: 40 INJECTION, SOLUTION INTRAVENOUS at 06:21

## 2024-11-09 RX ADMIN — Medication 1 TABLET: at 08:12

## 2024-11-09 RX ADMIN — POTASSIUM CHLORIDE 10 MEQ: 7.46 INJECTION, SOLUTION INTRAVENOUS at 07:54

## 2024-11-09 RX ADMIN — CHLORDIAZEPOXIDE HYDROCHLORIDE 50 MG: 25 CAPSULE ORAL at 08:12

## 2024-11-09 RX ADMIN — LORAZEPAM 4 MG: 2 TABLET ORAL at 08:31

## 2024-11-09 RX ADMIN — LORAZEPAM 4 MG: 2 INJECTION, SOLUTION INTRAMUSCULAR; INTRAVENOUS at 19:30

## 2024-11-09 RX ADMIN — PREGABALIN 75 MG: 25 CAPSULE ORAL at 08:12

## 2024-11-09 RX ADMIN — FOLIC ACID 1 MG: 1 TABLET ORAL at 08:13

## 2024-11-09 RX ADMIN — CHLORDIAZEPOXIDE HYDROCHLORIDE 50 MG: 25 CAPSULE ORAL at 19:30

## 2024-11-09 RX ADMIN — DEXMEDETOMIDINE HYDROCHLORIDE IN SODIUM CHLORIDE 1 MCG/KG/HR: 4 INJECTION INTRAVENOUS at 21:29

## 2024-11-09 RX ADMIN — POTASSIUM CHLORIDE 10 MEQ: 7.46 INJECTION, SOLUTION INTRAVENOUS at 12:20

## 2024-11-09 RX ADMIN — LORAZEPAM 3 MG: 1 TABLET ORAL at 15:53

## 2024-11-09 RX ADMIN — LORAZEPAM 3 MG: 2 INJECTION, SOLUTION INTRAMUSCULAR; INTRAVENOUS at 02:31

## 2024-11-09 RX ADMIN — POTASSIUM CHLORIDE 10 MEQ: 7.46 INJECTION, SOLUTION INTRAVENOUS at 10:13

## 2024-11-09 RX ADMIN — LORAZEPAM 4 MG: 2 INJECTION, SOLUTION INTRAMUSCULAR; INTRAVENOUS at 17:59

## 2024-11-09 RX ADMIN — DEXMEDETOMIDINE HYDROCHLORIDE IN SODIUM CHLORIDE 0.2 MCG/KG/HR: 4 INJECTION INTRAVENOUS at 09:06

## 2024-11-09 RX ADMIN — POTASSIUM CHLORIDE 10 MEQ: 7.46 INJECTION, SOLUTION INTRAVENOUS at 07:00

## 2024-11-09 RX ADMIN — POTASSIUM CHLORIDE 10 MEQ: 7.46 INJECTION, SOLUTION INTRAVENOUS at 09:03

## 2024-11-09 RX ADMIN — MUPIROCIN: 20 OINTMENT TOPICAL at 19:31

## 2024-11-09 RX ADMIN — LORAZEPAM 2 MG: 2 INJECTION, SOLUTION INTRAMUSCULAR; INTRAVENOUS at 06:32

## 2024-11-09 RX ADMIN — CHLORDIAZEPOXIDE HYDROCHLORIDE 50 MG: 25 CAPSULE ORAL at 15:36

## 2024-11-09 RX ADMIN — CHLORDIAZEPOXIDE HYDROCHLORIDE 50 MG: 25 CAPSULE ORAL at 02:31

## 2024-11-09 RX ADMIN — PREGABALIN 75 MG: 25 CAPSULE ORAL at 19:30

## 2024-11-09 RX ADMIN — POTASSIUM CHLORIDE 10 MEQ: 7.46 INJECTION, SOLUTION INTRAVENOUS at 11:19

## 2024-11-09 RX ADMIN — Medication 100 MG: at 08:13

## 2024-11-09 NOTE — PROGRESS NOTES
Pt very agitated and making multiple attempts to get up out of bed so that he can leave because he \"has a wedding to be at.\" Originally had 4mg PO lorazepam for CIWA pulled out for pt and he refused to take it d/t being agitated and stating we are keeping here \"against his will.\" Assisted pt in calling his mom for him who explained to him that he needs to stay here and be treated.     Wasted 4mg PO lorazepam with CHAYO Mercado RN and pulled 4mg IV to give to pt instead.     Electronically signed by Gogo Johnson RN on 11/9/2024 at 6:05 PM    Electronically signed by Maira Mercado RN on 11/9/2024 at 6:06 PM

## 2024-11-09 NOTE — PLAN OF CARE
Problem: Safety - Adult  Goal: Free from fall injury  Outcome: Progressing     Problem: Safety - Medical Restraint  Goal: Remains free of injury from restraints (Restraint for Interference with Medical Device)  Description: INTERVENTIONS:  1. Determine that other, less restrictive measures have been tried or would not be effective before applying the restraint  2. Evaluate the patient's condition at the time of restraint application  3. Inform patient/family regarding the reason for restraint  4. Q2H: Monitor safety, psychosocial status, comfort, nutrition and hydration  Outcome: Progressing  Flowsheets  Taken 11/8/2024 2200  Remains free of injury from restraints (restraint for interference with medical device): Every 2 hours: Monitor safety, psychosocial status, comfort, nutrition and hydration  Taken 11/8/2024 2120  Remains free of injury from restraints (restraint for interference with medical device): Every 2 hours: Monitor safety, psychosocial status, comfort, nutrition and hydration     Problem: Discharge Planning  Goal: Discharge to home or other facility with appropriate resources  Outcome: Progressing

## 2024-11-09 NOTE — PROGRESS NOTES
Neurology Progress Note    ID: Fernando Abdullahi is a 29 y.o. male    : 1995     LOS: 2 days     ASSESSMENT    The patient remains in the mild degree of ataxia and tremor.  CT brain showed no acute pathology.  Positive EtOH level.  Negative seizure.  Elevated AST and ALT.  EEG showed excessive beta activity.     From neurology perspective, this can be alcohol related seizure.  Based on the seizure prophylaxis, the patient needs short-term antiepileptic medication.    24: The patient remains to have agitation and behavioral disturbance.  Precedex was initiated this morning.  There has been no seizure-like to be reported.     Plan:     Pregabalin 75 mg BID.  Seizure precaution.  Ativan 2 mg as needed for breakthrough seizure.  Continue thiamine and chlordiazepoxide.    If the patient cannot get oral, neurology plan to start lacosamide  mg twice daily.    Medications:  Scheduled Meds:    sodium chloride flush  5-40 mL IntraVENous 2 times per day    [Held by provider] enoxaparin  40 mg SubCUTAneous Daily    thiamine  100 mg Oral Daily    mupirocin   Each Nostril BID    folic acid  1 mg Oral Daily    therapeutic multivitamin-minerals  1 tablet Oral Daily    pregabalin  75 mg Oral BID    chlordiazePOXIDE  50 mg Oral Q6H     Continuous Infusions:    dexmedeTOMIDine HCl in NaCl 0.2 mcg/kg/hr (24 0906)    sodium chloride       PRN Meds: sodium chloride flush, sodium chloride, potassium chloride **OR** potassium chloride, magnesium sulfate, ondansetron **OR** ondansetron, polyethylene glycol, acetaminophen **OR** acetaminophen, LORazepam **OR** LORazepam **OR** LORazepam **OR** LORazepam **OR** LORazepam **OR** LORazepam **OR** LORazepam **OR** LORazepam, morphine    OBJECTIVE  Vital signs in the last 24 hours:  Vitals:    24 0900   BP: (!) 139/101   Pulse: 93   Resp: 17   Temp:    SpO2:        Intake/Output last 3 shifts:  I/O last 3 completed shifts:  In: 1031.6 [P.O.:480; I.V.:438.9; IV

## 2024-11-09 NOTE — PROGRESS NOTES
Pt resting quietly with eyes closed in bed. Removed BSWR at this time as a trial to see if pt can avoid pulling at PIV/tubing/wires. Pt's parents are at bedside and are aware and state that they will do their best to re-direct him as well if they see him attempting to pull at anything. Telesitter remains present in room as well.

## 2024-11-09 NOTE — PROGRESS NOTES
Care rounds completed with Dr. Garcia and multidisciplinary team. Reviewed labs, meds, VS, assessment, & plan of care for today. See dictated note and new orders for details. New orders received.

## 2024-11-09 NOTE — PROGRESS NOTES
Shift assessment, completed, see flow sheet. Pt is alert and oriented x4 with intermittent hallucinations noted. Pt very agitated and restless, not redirectable.    VSS. Respirations are easy, even, and unlabored. Bilateral lung sounds are clear throughout. Pt refused breakfast this AM, took PO meds w/o difficulty.      PIV x2 remain in place and patent with sites and dressings C/D/I. External catheter remains in place and patent. Bilateral soft wrist restraints and lap belt remain in place for pt safety.     Call light within reach. Bed in lowest position. Bed alarm on. Telesitter remains present at bedside. Pt's parents present at bedside and updated on pt's condition. No further questions or concerns at this time.

## 2024-11-09 NOTE — PROGRESS NOTES
Blood pressure 118/81, pulse (!) 113, temperature 97.3 °F (36.3 °C), temperature source Tympanic, resp. rate 26, height 1.854 m (6' 1\"), weight 68 kg (150 lb), SpO2 100%.    Patient keeps getting up out of bed, taking off pulse ox, telemetry monitor, trying to walk around. Explained to patient several times it is not safe for him to be up walking with him having seizures and with ativan on board. Patient does not understand. MD made aware. N.O [place] belt. Lap Belt applied to patient with his parents permission and understanding.     Shift assessment complete. See doc flow. Nightly medications given see MAR. Patient very anxious with tremors. Call light and bedside table within easy reach.

## 2024-11-09 NOTE — PROGRESS NOTES
Pulmonary & Critical Care Medicine ICU Progress Note    CC: ETOH w/d and seizure     Events of Last 24 hours: Patient has become more agitated overnight and is getting out of bed.  Precedex drip was started. He is somnolent.      Vitals:  BP (!) 131/110   Pulse 89   Temp 97.8 °F (36.6 °C) (Temporal)   Resp 16   Ht 1.854 m (6' 1\")   Wt 68 kg (150 lb)   SpO2 98%   BMI 19.79 kg/m²    Constitutional:  No acute distress.   Eyes: PERRL. Conjunctivae anicteric.   ENT: Normal nose. Normal tongue.    Neck:  Trachea is midline.   Respiratory: No accessory muscle usage.  Decreased breath sounds. No wheezes. No rales. No Rhonchi.  Cardiovascular: Normal S1S2. No digit clubbing. No digit cyanosis. No LE edema.   Psychiatric: No anxiety or Agitation. Alert and Oriented to person, place and time.     Scheduled Meds:   sodium chloride flush  5-40 mL IntraVENous 2 times per day    [Held by provider] enoxaparin  40 mg SubCUTAneous Daily    thiamine  100 mg Oral Daily    mupirocin   Each Nostril BID    folic acid  1 mg Oral Daily    therapeutic multivitamin-minerals  1 tablet Oral Daily    pregabalin  75 mg Oral BID    chlordiazePOXIDE  50 mg Oral Q6H     PRN Meds:  sodium chloride flush, sodium chloride, potassium chloride **OR** potassium chloride, magnesium sulfate, ondansetron **OR** ondansetron, polyethylene glycol, acetaminophen **OR** acetaminophen, LORazepam **OR** LORazepam **OR** LORazepam **OR** LORazepam **OR** LORazepam **OR** LORazepam **OR** LORazepam **OR** LORazepam, morphine    Results:  CBC:   Recent Labs     11/07/24 2052 11/08/24 0430 11/09/24 0443   WBC 4.7 4.7 4.5   HGB 12.3* 12.6* 14.0   HCT 36.9* 37.1* 40.9   MCV 89.8 88.4 89.1   PLT 73* 56* see below     BMP:   Recent Labs     11/07/24 2052 11/08/24 0430 11/09/24 0443   * 131* 130*   K 3.0* 3.3* 3.0*   CL 92* 92* 93*   CO2 19* 24 23   BUN 7 7 7   CREATININE 0.5* 0.4* 0.5*     LIVER PROFILE:   Recent Labs     11/07/24 2052   *

## 2024-11-09 NOTE — PROGRESS NOTES
Internal Medicine ICU Progress Note      Events of Last 24 hours:     Patient agitated.  I started the patient on Precedex.  He is calm.  EEG showed no seizure activity.  Parents at bedside.      Invasive Lines: PIV        MV:  n/a    No results for input(s): \"PHART\", \"TXY0DSZ\", \"PO2ART\" in the last 72 hours.    MV Settings:     / / /     IV:   dexmedeTOMIDine HCl in NaCl 0.2 mcg/kg/hr (24 0906)    sodium chloride         Vitals:  Temp  Av.6 °F (36.4 °C)  Min: 97.3 °F (36.3 °C)  Max: 97.8 °F (36.6 °C)  Pulse  Av.4  Min: 77  Max: 113  BP  Min: 104/75  Max: 139/101  SpO2  Av %  Min: 98 %  Max: 100 %  Patient Vitals for the past 4 hrs:   BP Temp Temp src Pulse Resp   24 1400 104/75 -- -- 82 18   24 1300 106/86 -- -- 77 16   24 1200 (!) 113/99 -- -- 79 16   24 1100 116/82 97.5 °F (36.4 °C) Temporal 86 15       CVP:        Intake/Output Summary (Last 24 hours) at 2024 1438  Last data filed at 2024 0608  Gross per 24 hour   Intake --   Output 1950 ml   Net -1950 ml       EXAM:  General: ill appearing and sleepy.  Eyes: PERRL. No sclera icterus. No conjunctiva injected.  ENT: No discharge. Pharynx clear.  Neck: Trachea midline. Normal thyroid.  Resp: No accessory muscle use. No crackles. No wheezing. No rhonchi. No dullness on percussion.   CV: Regular rate. Regular rhythm. No mumur or rub. No edema. No JVD. Palpable pedal pulses.   GI: Non-tender. Non-distended. No masses. No organmegaly. Normal bowel sounds. No hernia.  Skin: Warm and dry. No nodule on exposed extremities. No rash on exposed extremities.  Lymph: No cervical LAD. No supraclavicular LAD.   M/S: No cyanosis. No joint deformity. No clubbing.   Neuro: RAMESH.  Psych: RAMESH.     Medications:  Scheduled Meds:   sodium chloride flush  5-40 mL IntraVENous 2 times per day    [Held by provider] enoxaparin  40 mg SubCUTAneous Daily    thiamine  100 mg Oral Daily    mupirocin   Each Nostril BID    folic acid  1 mg

## 2024-11-09 NOTE — PROGRESS NOTES
K+ 3.0 waiting for replacement K+ to come up from pharmacy.   Mag = 1.45 PRN replacement infusing at this time.

## 2024-11-10 LAB
ANION GAP SERPL CALCULATED.3IONS-SCNC: 17 MMOL/L (ref 3–16)
BASOPHILS # BLD: 0 K/UL (ref 0–0.2)
BASOPHILS NFR BLD: 0.4 %
BUN SERPL-MCNC: 9 MG/DL (ref 7–20)
CALCIUM SERPL-MCNC: 8.9 MG/DL (ref 8.3–10.6)
CHLORIDE SERPL-SCNC: 93 MMOL/L (ref 99–110)
CO2 SERPL-SCNC: 21 MMOL/L (ref 21–32)
CREAT SERPL-MCNC: 0.5 MG/DL (ref 0.9–1.3)
DEPRECATED RDW RBC AUTO: 14.1 % (ref 12.4–15.4)
EOSINOPHIL # BLD: 0 K/UL (ref 0–0.6)
EOSINOPHIL NFR BLD: 0.5 %
GFR SERPLBLD CREATININE-BSD FMLA CKD-EPI: >90 ML/MIN/{1.73_M2}
GLUCOSE SERPL-MCNC: 74 MG/DL (ref 70–99)
HCT VFR BLD AUTO: 42 % (ref 40.5–52.5)
HGB BLD-MCNC: 14.2 G/DL (ref 13.5–17.5)
LYMPHOCYTES # BLD: 0.5 K/UL (ref 1–5.1)
LYMPHOCYTES NFR BLD: 6.2 %
MCH RBC QN AUTO: 30.5 PG (ref 26–34)
MCHC RBC AUTO-ENTMCNC: 33.8 G/DL (ref 31–36)
MCV RBC AUTO: 90.2 FL (ref 80–100)
MONOCYTES # BLD: 0.6 K/UL (ref 0–1.3)
MONOCYTES NFR BLD: 7.8 %
NEUTROPHILS # BLD: 6.9 K/UL (ref 1.7–7.7)
NEUTROPHILS NFR BLD: 85.1 %
PLATELET # BLD AUTO: ABNORMAL K/UL (ref 135–450)
PLATELET BLD QL SMEAR: ABNORMAL
PMV BLD AUTO: ABNORMAL FL (ref 5–10.5)
POTASSIUM SERPL-SCNC: 3.8 MMOL/L (ref 3.5–5.1)
RBC # BLD AUTO: 4.66 M/UL (ref 4.2–5.9)
SLIDE REVIEW: ABNORMAL
SODIUM SERPL-SCNC: 131 MMOL/L (ref 136–145)
WBC # BLD AUTO: 8.1 K/UL (ref 4–11)

## 2024-11-10 PROCEDURE — 85025 COMPLETE CBC W/AUTO DIFF WBC: CPT

## 2024-11-10 PROCEDURE — 2000000000 HC ICU R&B

## 2024-11-10 PROCEDURE — 36415 COLL VENOUS BLD VENIPUNCTURE: CPT

## 2024-11-10 PROCEDURE — 6370000000 HC RX 637 (ALT 250 FOR IP): Performed by: FAMILY MEDICINE

## 2024-11-10 PROCEDURE — 2500000003 HC RX 250 WO HCPCS: Performed by: INTERNAL MEDICINE

## 2024-11-10 PROCEDURE — 99291 CRITICAL CARE FIRST HOUR: CPT | Performed by: INTERNAL MEDICINE

## 2024-11-10 PROCEDURE — 80048 BASIC METABOLIC PNL TOTAL CA: CPT

## 2024-11-10 PROCEDURE — 99233 SBSQ HOSP IP/OBS HIGH 50: CPT | Performed by: INTERNAL MEDICINE

## 2024-11-10 PROCEDURE — 6360000002 HC RX W HCPCS: Performed by: FAMILY MEDICINE

## 2024-11-10 PROCEDURE — 2580000003 HC RX 258: Performed by: FAMILY MEDICINE

## 2024-11-10 PROCEDURE — 6370000000 HC RX 637 (ALT 250 FOR IP): Performed by: PSYCHIATRY & NEUROLOGY

## 2024-11-10 RX ADMIN — CHLORDIAZEPOXIDE HYDROCHLORIDE 50 MG: 25 CAPSULE ORAL at 19:45

## 2024-11-10 RX ADMIN — LORAZEPAM 3 MG: 2 INJECTION, SOLUTION INTRAMUSCULAR; INTRAVENOUS at 19:44

## 2024-11-10 RX ADMIN — Medication 10 ML: at 21:35

## 2024-11-10 RX ADMIN — DEXMEDETOMIDINE HYDROCHLORIDE IN SODIUM CHLORIDE 0.4 MCG/KG/HR: 4 INJECTION INTRAVENOUS at 17:10

## 2024-11-10 RX ADMIN — CHLORDIAZEPOXIDE HYDROCHLORIDE 50 MG: 25 CAPSULE ORAL at 14:40

## 2024-11-10 RX ADMIN — DEXMEDETOMIDINE HYDROCHLORIDE IN SODIUM CHLORIDE 1 MCG/KG/HR: 4 INJECTION INTRAVENOUS at 02:57

## 2024-11-10 RX ADMIN — LORAZEPAM 3 MG: 2 INJECTION, SOLUTION INTRAMUSCULAR; INTRAVENOUS at 08:56

## 2024-11-10 RX ADMIN — MUPIROCIN: 20 OINTMENT TOPICAL at 21:35

## 2024-11-10 RX ADMIN — CHLORDIAZEPOXIDE HYDROCHLORIDE 50 MG: 25 CAPSULE ORAL at 02:30

## 2024-11-10 RX ADMIN — LORAZEPAM 4 MG: 2 INJECTION, SOLUTION INTRAMUSCULAR; INTRAVENOUS at 06:44

## 2024-11-10 RX ADMIN — DEXMEDETOMIDINE HYDROCHLORIDE IN SODIUM CHLORIDE 1.2 MCG/KG/HR: 4 INJECTION INTRAVENOUS at 08:32

## 2024-11-10 RX ADMIN — LORAZEPAM 3 MG: 2 INJECTION, SOLUTION INTRAMUSCULAR; INTRAVENOUS at 21:30

## 2024-11-10 RX ADMIN — PREGABALIN 75 MG: 25 CAPSULE ORAL at 19:44

## 2024-11-10 NOTE — PROGRESS NOTES
Pt very restless this AM. Attempting to throw his legs over the bedrails in attempt to get up out of bed despite having a lap belt and BSWR in place. Pt noted to be having hallucinations, states there is a bird in his room that keeps chirping. Pt is not oriented this AM other than to his name. Per LINA, to administer 3mg IV ativan to help with ETOH w/d.

## 2024-11-10 NOTE — PROGRESS NOTES
Blood pressure (!) 133/97, pulse 87, temperature 98.4 °F (36.9 °C), temperature source Temporal, resp. rate 18, height 1.854 m (6' 1\"), weight 67.6 kg (149 lb), SpO2 100%.    2 staff members assisting in changing patients attends and bettye under him due to patient urinating in the bed. Patient became very aggressive, fighting staff, security called to room, patient held while dry linens placed under him and restraints back in place. 4mg ativan given per CIWA scale.

## 2024-11-10 NOTE — PROGRESS NOTES
Removed lap belt and BSWR at this time while parents are at bedside and telesitter remains present in room. Pt has been asleep intermittently and is becoming more oriented. Pt is re-directable and more oriented than previously. Will continue to monitor for safety of pt and re-apply restraints if necessary.

## 2024-11-10 NOTE — PROGRESS NOTES
Pulmonary & Critical Care Medicine ICU Progress Note    CC: ETOH w/d and seizure     Events of Last 24 hours: Patient is maintained on the Precedex drip and also received additional Ativan last night.  He had episode of extreme agitation and violent behavior and security was called to the room.      Vitals:  BP (!) 133/97   Pulse 87   Temp 98.4 °F (36.9 °C) (Temporal)   Resp 18   Ht 1.854 m (6' 1\")   Wt 67.6 kg (149 lb)   SpO2 100%   BMI 19.66 kg/m²    Constitutional:  No acute distress.   Eyes: PERRL. Conjunctivae anicteric.   ENT: Normal nose. Normal tongue.    Neck:  Trachea is midline.   Respiratory: No accessory muscle usage.  Decreased breath sounds. No wheezes. No rales. No Rhonchi.  Cardiovascular: Normal S1S2. No digit clubbing. No digit cyanosis. No LE edema.   Psychiatric: No anxiety or Agitation. Alert and Oriented to person, place and time.     Scheduled Meds:   sodium chloride flush  5-40 mL IntraVENous 2 times per day    [Held by provider] enoxaparin  40 mg SubCUTAneous Daily    thiamine  100 mg Oral Daily    mupirocin   Each Nostril BID    folic acid  1 mg Oral Daily    therapeutic multivitamin-minerals  1 tablet Oral Daily    pregabalin  75 mg Oral BID    chlordiazePOXIDE  50 mg Oral Q6H     PRN Meds:  sodium chloride flush, sodium chloride, potassium chloride **OR** potassium chloride, magnesium sulfate, ondansetron **OR** ondansetron, polyethylene glycol, acetaminophen **OR** acetaminophen, LORazepam **OR** LORazepam **OR** LORazepam **OR** LORazepam **OR** LORazepam **OR** LORazepam **OR** LORazepam **OR** LORazepam, morphine    Results:  CBC:   Recent Labs     11/08/24  0430 11/09/24  0443 11/10/24  0443   WBC 4.7 4.5 8.1   HGB 12.6* 14.0 14.2   HCT 37.1* 40.9 42.0   MCV 88.4 89.1 90.2   PLT 56* see below see below     BMP:   Recent Labs     11/08/24  0430 11/09/24  0443 11/10/24  0443   * 130* 131*   K 3.3* 3.0* 3.8   CL 92* 93* 93*   CO2 24 23 21   BUN 7 7 9   CREATININE 0.4*  0.5* 0.5*     LIVER PROFILE:   Recent Labs     11/07/24 2052   *   ALT 59*   BILITOT 0.7   ALKPHOS 79       Imaging:  Neg head ct        ASSESSMENT:  Seizure likely due to alcohol withdrawal and DTs  Hallucinations  Alcohol abuse  TMP -improving     PLAN:  Titrate Precedex drip to control alcohol withdrawal symptoms and patient safe   CIWA  Librium  MVI  Replace potassium  Resume diet   Neurology following  Lovenox DVT prophylaxis  Due to at least single organ failure and risk of rapid deterioration, I spent  31 minutes of Critical care time reviewing labs/films, examining patient, collaborating with other physicians. This does not include time performing critical procedures.

## 2024-11-10 NOTE — PROGRESS NOTES
Internal Medicine ICU Progress Note      Events of Last 24 hours:     Patient agitated.  I started the patient on Precedex.  He is calm.  EEG showed no seizure activity.  Parents at bedside.    11/10-became agitated yesterday.  Received additional Ativan.  Had 1 episode of extreme agitation and violent behavior and security was called into the room.  This morning his sleep.      Invasive Lines: PIV        MV:  n/a    No results for input(s): \"PHART\", \"SVC0RMY\", \"PO2ART\" in the last 72 hours.    MV Settings:     / / /     IV:   dexmedeTOMIDine HCl in NaCl 1.2 mcg/kg/hr (11/10/24 0832)    sodium chloride         Vitals:  Temp  Av.2 °F (36.8 °C)  Min: 97.9 °F (36.6 °C)  Max: 98.6 °F (37 °C)  Pulse  Av.9  Min: 66  Max: 93  BP  Min: 92/66  Max: 139/103  SpO2  Av.1 %  Min: 92 %  Max: 100 %  Patient Vitals for the past 4 hrs:   BP Temp Temp src Pulse Resp SpO2   11/10/24 1200 92/66 -- -- 78 22 94 %   11/10/24 1100 96/69 97.9 °F (36.6 °C) Temporal 78 20 94 %       CVP:        Intake/Output Summary (Last 24 hours) at 11/10/2024 1420  Last data filed at 11/10/2024 0614  Gross per 24 hour   Intake 885.83 ml   Output 250 ml   Net 635.83 ml       EXAM:  General: ill appearing and sleepy.  Eyes: PERRL. No sclera icterus. No conjunctiva injected.  ENT: No discharge. Pharynx clear.  Neck: Trachea midline. Normal thyroid.  Resp: No accessory muscle use. No crackles. No wheezing. No rhonchi. No dullness on percussion.   CV: Regular rate. Regular rhythm. No mumur or rub. No edema. No JVD. Palpable pedal pulses.   GI: Non-tender. Non-distended. No masses. No organmegaly. Normal bowel sounds. No hernia.  Skin: Warm and dry. No nodule on exposed extremities. No rash on exposed extremities.  Lymph: No cervical LAD. No supraclavicular LAD.   M/S: No cyanosis. No joint deformity. No clubbing.   Neuro: RAMESH.  Psych: RAMESH.     Medications:  Scheduled Meds:   sodium chloride flush  5-40 mL IntraVENous 2 times per day    [Held by

## 2024-11-10 NOTE — PROGRESS NOTES
Blood pressure (!) 133/92, pulse 91, temperature 98 °F (36.7 °C), temperature source Temporal, resp. rate 23, height 1.854 m (6' 1\"), weight 68 kg (150 lb), SpO2 100%.    Precedex infusing at 1 mcg/kg/h.     Patient is very confused. Patient thinks I am a cat. He is seeing elephants in the room, tremors continue in hands/arms, sweating.    BUE soft wrist restraints in place due to attempts of getting up out of bed and falling  No signs of injury noted and PROM performed.     Electronically signed by Kathy Myers RN on 11/9/2024 at 8:06 PM

## 2024-11-10 NOTE — PLAN OF CARE
Problem: Safety - Adult  Goal: Free from fall injury  Outcome: Progressing     Problem: Confusion  Goal: Confusion, delirium, dementia, or psychosis is improved or at baseline  Description: INTERVENTIONS:  1. Assess for possible contributors to thought disturbance, including medications, impaired vision or hearing, underlying metabolic abnormalities, dehydration, psychiatric diagnoses, and notify attending LIP  2. [place] high risk fall precautions, as indicated  3. Provide frequent short contacts to provide reality reorientation, refocusing and direction  4. Decrease environmental stimuli, including noise as appropriate  5. Monitor and intervene to maintain adequate nutrition, hydration, elimination, sleep and activity  6. If unable to ensure safety without constant attention obtain sitter and review sitter guidelines with assigned personnel  7. Initiate Psychosocial CNS and Spiritual Care consult, as indicated  Outcome: Progressing

## 2024-11-11 LAB
DEPRECATED RDW RBC AUTO: 14.6 % (ref 12.4–15.4)
HCT VFR BLD AUTO: 39.3 % (ref 40.5–52.5)
HGB BLD-MCNC: 13.5 G/DL (ref 13.5–17.5)
MCH RBC QN AUTO: 31 PG (ref 26–34)
MCHC RBC AUTO-ENTMCNC: 34.4 G/DL (ref 31–36)
MCV RBC AUTO: 90 FL (ref 80–100)
PLATELET # BLD AUTO: 106 K/UL (ref 135–450)
PMV BLD AUTO: 9.6 FL (ref 5–10.5)
RBC # BLD AUTO: 4.36 M/UL (ref 4.2–5.9)
WBC # BLD AUTO: 8 K/UL (ref 4–11)

## 2024-11-11 PROCEDURE — 6370000000 HC RX 637 (ALT 250 FOR IP): Performed by: PSYCHIATRY & NEUROLOGY

## 2024-11-11 PROCEDURE — C9254 INJECTION, LACOSAMIDE: HCPCS | Performed by: PSYCHIATRY & NEUROLOGY

## 2024-11-11 PROCEDURE — 6370000000 HC RX 637 (ALT 250 FOR IP): Performed by: FAMILY MEDICINE

## 2024-11-11 PROCEDURE — 6360000002 HC RX W HCPCS: Performed by: PSYCHIATRY & NEUROLOGY

## 2024-11-11 PROCEDURE — 2580000003 HC RX 258: Performed by: FAMILY MEDICINE

## 2024-11-11 PROCEDURE — 36415 COLL VENOUS BLD VENIPUNCTURE: CPT

## 2024-11-11 PROCEDURE — 2500000003 HC RX 250 WO HCPCS: Performed by: INTERNAL MEDICINE

## 2024-11-11 PROCEDURE — 85027 COMPLETE CBC AUTOMATED: CPT

## 2024-11-11 PROCEDURE — 6370000000 HC RX 637 (ALT 250 FOR IP): Performed by: INTERNAL MEDICINE

## 2024-11-11 PROCEDURE — 6360000002 HC RX W HCPCS: Performed by: INTERNAL MEDICINE

## 2024-11-11 PROCEDURE — 99291 CRITICAL CARE FIRST HOUR: CPT | Performed by: INTERNAL MEDICINE

## 2024-11-11 PROCEDURE — 99232 SBSQ HOSP IP/OBS MODERATE 35: CPT | Performed by: INTERNAL MEDICINE

## 2024-11-11 PROCEDURE — 2000000000 HC ICU R&B

## 2024-11-11 RX ORDER — NICOTINE 21 MG/24HR
1 PATCH, TRANSDERMAL 24 HOURS TRANSDERMAL DAILY
Status: DISCONTINUED | OUTPATIENT
Start: 2024-11-11 | End: 2024-11-15 | Stop reason: HOSPADM

## 2024-11-11 RX ORDER — LACOSAMIDE 10 MG/ML
100 INJECTION, SOLUTION INTRAVENOUS 2 TIMES DAILY
Status: DISCONTINUED | OUTPATIENT
Start: 2024-11-11 | End: 2024-11-14

## 2024-11-11 RX ORDER — ENOXAPARIN SODIUM 100 MG/ML
40 INJECTION SUBCUTANEOUS DAILY
Status: DISCONTINUED | OUTPATIENT
Start: 2024-11-11 | End: 2024-11-15 | Stop reason: HOSPADM

## 2024-11-11 RX ADMIN — DEXMEDETOMIDINE HYDROCHLORIDE IN SODIUM CHLORIDE 0.4 MCG/KG/HR: 4 INJECTION INTRAVENOUS at 10:34

## 2024-11-11 RX ADMIN — LACOSAMIDE 100 MG: 10 INJECTION INTRAVENOUS at 20:57

## 2024-11-11 RX ADMIN — MUPIROCIN: 20 OINTMENT TOPICAL at 20:32

## 2024-11-11 RX ADMIN — CHLORDIAZEPOXIDE HYDROCHLORIDE 50 MG: 25 CAPSULE ORAL at 02:14

## 2024-11-11 RX ADMIN — ENOXAPARIN SODIUM 40 MG: 100 INJECTION SUBCUTANEOUS at 10:33

## 2024-11-11 RX ADMIN — Medication 1 TABLET: at 08:40

## 2024-11-11 RX ADMIN — CHLORDIAZEPOXIDE HYDROCHLORIDE 50 MG: 25 CAPSULE ORAL at 20:31

## 2024-11-11 RX ADMIN — LACOSAMIDE 100 MG: 10 INJECTION INTRAVENOUS at 12:31

## 2024-11-11 RX ADMIN — PREGABALIN 75 MG: 25 CAPSULE ORAL at 08:40

## 2024-11-11 RX ADMIN — FOLIC ACID 1 MG: 1 TABLET ORAL at 08:40

## 2024-11-11 RX ADMIN — DEXMEDETOMIDINE HYDROCHLORIDE IN SODIUM CHLORIDE 0.8 MCG/KG/HR: 4 INJECTION INTRAVENOUS at 02:16

## 2024-11-11 RX ADMIN — MUPIROCIN: 20 OINTMENT TOPICAL at 08:40

## 2024-11-11 RX ADMIN — LORAZEPAM 1 MG: 1 TABLET ORAL at 21:59

## 2024-11-11 RX ADMIN — LORAZEPAM 2 MG: 2 TABLET ORAL at 15:30

## 2024-11-11 RX ADMIN — Medication 10 ML: at 08:41

## 2024-11-11 RX ADMIN — Medication 10 ML: at 20:32

## 2024-11-11 RX ADMIN — Medication 100 MG: at 08:40

## 2024-11-11 RX ADMIN — CHLORDIAZEPOXIDE HYDROCHLORIDE 50 MG: 25 CAPSULE ORAL at 15:30

## 2024-11-11 RX ADMIN — CHLORDIAZEPOXIDE HYDROCHLORIDE 50 MG: 25 CAPSULE ORAL at 08:40

## 2024-11-11 RX ADMIN — PREGABALIN 75 MG: 25 CAPSULE ORAL at 20:31

## 2024-11-11 NOTE — PLAN OF CARE
Problem: Safety - Adult  Goal: Free from fall injury  Outcome: Progressing     Problem: Confusion  Goal: Confusion, delirium, dementia, or psychosis is improved or at baseline  Description: INTERVENTIONS:  1. Assess for possible contributors to thought disturbance, including medications, impaired vision or hearing, underlying metabolic abnormalities, dehydration, psychiatric diagnoses, and notify attending LIP  2. [place] high risk fall precautions, as indicated  3. Provide frequent short contacts to provide reality reorientation, refocusing and direction  4. Decrease environmental stimuli, including noise as appropriate  5. Monitor and intervene to maintain adequate nutrition, hydration, elimination, sleep and activity  6. If unable to ensure safety without constant attention obtain sitter and review sitter guidelines with assigned personnel  7. Initiate Psychosocial CNS and Spiritual Care consult, as indicated  Outcome: Progressing

## 2024-11-11 NOTE — PROGRESS NOTES
Care rounds completed with Dr. Garcia and multidisciplinary team. Reviewed labs, meds, VS, assessment, & plan of care for today.  - OK with Slow decrease in Precedex, plan to wean off if patient tolerates.   -Add Lovenox- informed patients SCD are making more agitated.   -add Nicotine patch.     See dictated note and new orders for details.      Parents at bedside and updated on POC, Parents are receptive and helpful with patient at bedside.

## 2024-11-11 NOTE — PROGRESS NOTES
Pulmonary & Critical Care Medicine ICU Progress Note    CC: ETOH w/d and seizure     Events of Last 24 hours: Patient is maintained on the Precedex drip and also received additional Ativan last night.  He had episode of extreme agitation and violent behavior and security was called to the room.    Precedex 0.4  Vitals:  BP 90/61   Pulse 70   Temp 97.1 °F (36.2 °C) (Temporal)   Resp 15   Ht 1.854 m (6' 1\")   Wt 67.4 kg (148 lb 11.2 oz)   SpO2 100%   BMI 19.62 kg/m²    Constitutional:  No acute distress.   Eyes: PERRL. Conjunctivae anicteric.   ENT: Normal nose. Normal tongue.    Neck:  Trachea is midline.   Respiratory: No accessory muscle usage.  Decreased breath sounds. No wheezes. No rales. No Rhonchi.  Cardiovascular: Normal S1S2. No digit clubbing. No digit cyanosis. No LE edema.   Psychiatric: No anxiety or Agitation. Alert and Oriented to person, place and time.     Scheduled Meds:   sodium chloride flush  5-40 mL IntraVENous 2 times per day    [Held by provider] enoxaparin  40 mg SubCUTAneous Daily    thiamine  100 mg Oral Daily    mupirocin   Each Nostril BID    folic acid  1 mg Oral Daily    therapeutic multivitamin-minerals  1 tablet Oral Daily    pregabalin  75 mg Oral BID    chlordiazePOXIDE  50 mg Oral Q6H     PRN Meds:  sodium chloride flush, sodium chloride, potassium chloride **OR** potassium chloride, magnesium sulfate, ondansetron **OR** ondansetron, polyethylene glycol, acetaminophen **OR** acetaminophen, LORazepam **OR** LORazepam **OR** LORazepam **OR** LORazepam **OR** LORazepam **OR** LORazepam **OR** LORazepam **OR** LORazepam, morphine    Results:  CBC:   Recent Labs     11/09/24  0443 11/10/24  0443 11/11/24  0423   WBC 4.5 8.1 8.0   HGB 14.0 14.2 13.5   HCT 40.9 42.0 39.3*   MCV 89.1 90.2 90.0   PLT see below see below 106*     BMP:   Recent Labs     11/09/24  0443 11/10/24  0443   * 131*   K 3.0* 3.8   CL 93* 93*   CO2 23 21   BUN 7 9   CREATININE 0.5* 0.5*     LIVER

## 2024-11-11 NOTE — PROGRESS NOTES
Blood pressure 109/77, pulse 95, temperature 97 °F (36.1 °C), temperature source Tympanic, resp. rate 22, height 1.854 m (6' 1\"), weight 67.6 kg (149 lb), SpO2 94%.    Patient is resting with eyes closed.   Shift assessment complete. See doc flow. Nightly medications given see MAR. Patient with no complaints at this time. Call light and bedside table within easy reach.     Electronically signed by Kathy Myers RN on 11/10/2024 at 11:07 PM

## 2024-11-11 NOTE — CARE COORDINATION
Met with patient and the patient's mother at bedside. The mother expressed some concern that if [facility] he might need to stay in [place] for therapy.    The mother is also concerned that [employer] is the patient's insurance and they do not pay well.    SADE advised there is an [facility] down the street - [facility] - that is very nice and CM can contact [facility] just to discuss options for a back up plan. The mother is in agreement with this plan.     SADE spoke with Sharan/[facility] - [phone]. Sharan advised that [employer] is usually one of their better payors. Occasionally they may put a limitation on the days covered but that is rare. Sharan ran the patient's benefits and he does have rehab coverage. To know more Sharan will need to run a full report and will get back to SADE.    SADE updated the family. They were happy to hear about the coverage. SADE suggested the family look up [facility] on line. The family advised they have an an Ipad in the room and can look up [facility].      Received a call from Sharan/[facility]. Sharan advised that the patient is very hector as he has an insurance that will cover most of the cost. The facility does not back bill and the patient will not leave with a balance. SADE will advise the family.    SADE provided Sharan's contact information to the family.

## 2024-11-11 NOTE — PROGRESS NOTES
Family at bedside called writer in- as patient became agitated with mother/father at bedside stating \" I don't know who you are\". Pt is A/O to person/ Place/ Situation/ time. But is hallucinating about seeing a TV on the counter and it is now missing or has fallen. Pt states he will walk out if (parents) don't leave. Pt is forgetful of people as he is looking at pictures on phone.     Parents opted to leave room and wait in waiting room.     CIWA-9. PRN medication to follow.   Precedex increased to 0.4mcg (See MAR).

## 2024-11-11 NOTE — PLAN OF CARE
Problem: Discharge Planning  Goal: Discharge to home or other facility with appropriate resources  11/11/2024 0823 by Iva Paniagua RN  Outcome: Progressing  11/10/2024 2306 by Kathy Myers RN  Outcome: Progressing     Problem: Safety - Adult  Goal: Free from fall injury  11/11/2024 0823 by Iva Paniagua RN  Outcome: Progressing  11/10/2024 2306 by Kathy Myers RN  Outcome: Progressing     Problem: Safety - Medical Restraint  Goal: Remains free of injury from restraints (Restraint for Interference with Medical Device)  Description: INTERVENTIONS:  1. Determine that other, less restrictive measures have been tried or would not be effective before applying the restraint  2. Evaluate the patient's condition at the time of restraint application  3. Inform patient/family regarding the reason for restraint  4. Q2H: Monitor safety, psychosocial status, comfort, nutrition and hydration  11/11/2024 0823 by Iva Paniagua RN  Outcome: Progressing  Flowsheets  Taken 11/11/2024 0600 by Kathy Myers RN  Remains free of injury from restraints (restraint for interference with medical device): Every 2 hours: Monitor safety, psychosocial status, comfort, nutrition and hydration  Taken 11/11/2024 0400 by Kathy Myers RN  Remains free of injury from restraints (restraint for interference with medical device): Every 2 hours: Monitor safety, psychosocial status, comfort, nutrition and hydration  Taken 11/11/2024 0200 by Kathy Myers RN  Remains free of injury from restraints (restraint for interference with medical device): Every 2 hours: Monitor safety, psychosocial status, comfort, nutrition and hydration  Taken 11/11/2024 0000 by Kathy Myers RN  Remains free of injury from restraints (restraint for interference with medical device): Every 2 hours: Monitor safety, psychosocial status, comfort, nutrition and hydration  11/10/2024 2306 by Kathy Myers RN  Outcome: Progressing  Flowsheets  Taken  improved or at baseline  Description: INTERVENTIONS:  1. Assess for possible contributors to thought disturbance, including medications, impaired vision or hearing, underlying metabolic abnormalities, dehydration, psychiatric diagnoses, and notify attending LIP  2. [place] high risk fall precautions, as indicated  3. Provide frequent short contacts to provide reality reorientation, refocusing and direction  4. Decrease environmental stimuli, including noise as appropriate  5. Monitor and intervene to maintain adequate nutrition, hydration, elimination, sleep and activity  6. If unable to ensure safety without constant attention obtain sitter and review sitter guidelines with assigned personnel  7. Initiate Psychosocial CNS and Spiritual Care consult, as indicated  11/11/2024 0885 by Iva Paniagua, RN  Outcome: Progressing  11/10/2024 2306 by Kathy Myers, RN  Outcome: Progressing

## 2024-11-11 NOTE — PROGRESS NOTES
Neurology Progress Note    ID: Fernando Abdullahi is a 29 y.o. male    : 1995     LOS: 4 days     ASSESSMENT    The patient remains in the mild degree of ataxia and tremor.  CT brain showed no acute pathology.  Positive EtOH level.  Negative seizure.  Elevated AST and ALT.  EEG showed excessive beta activity.     From neurology perspective, this can be alcohol related seizure.  Based on the seizure prophylaxis, the patient needs short-term antiepileptic medication.    24: The patient remains to have agitation and behavioral disturbance.  Precedex was initiated this morning.  There has been no seizure-like activity reported.    24: The patient has been on Precedex due to agitation for 2 days.  There has been no seizure-like activity reported.  The patient is not available for oral medication.     Plan:     Pregabalin 75 mg BID.  Seizure precaution.  Ativan 2 mg as needed for breakthrough seizure.  Continue thiamine and chlordiazepoxide.  Lacosamide 100 mg twice daily.    Medications:  Scheduled Meds:    enoxaparin  40 mg SubCUTAneous Daily    nicotine  1 patch TransDERmal Daily    sodium chloride flush  5-40 mL IntraVENous 2 times per day    thiamine  100 mg Oral Daily    mupirocin   Each Nostril BID    folic acid  1 mg Oral Daily    therapeutic multivitamin-minerals  1 tablet Oral Daily    pregabalin  75 mg Oral BID    chlordiazePOXIDE  50 mg Oral Q6H     Continuous Infusions:    dexmedeTOMIDine HCl in NaCl 0.4 mcg/kg/hr (24 1034)    sodium chloride       PRN Meds: sodium chloride flush, sodium chloride, potassium chloride **OR** potassium chloride, magnesium sulfate, ondansetron **OR** ondansetron, polyethylene glycol, acetaminophen **OR** acetaminophen, LORazepam **OR** LORazepam **OR** LORazepam **OR** LORazepam **OR** LORazepam **OR** LORazepam **OR** LORazepam **OR** LORazepam, morphine    OBJECTIVE  Vital signs in the last 24 hours:  Vitals:    24 1200   BP: 101/71   Pulse: 72   Resp:

## 2024-11-11 NOTE — PROGRESS NOTES
4 Eyes Skin Assessment     NAME:  Fernando Abdullahi  YOB: 1995  MEDICAL RECORD NUMBER:  [id number]    The patient is being assessed for  Other shift    I agree that at least one RN has performed a thorough Head to Toe Skin Assessment on the patient. ALL assessment sites listed below have been assessed.      Areas assessed by both nurses:    Head, Face, Ears, Shoulders, Back, Chest, Arms, Elbows, Hands, Sacrum. Buttock, Coccyx, Ischium, Legs. Feet and Heels, and Under Medical Devices         Does the Patient have a Wound? No noted wound(s)       [place] Prevention initiated by RN: Yes  Wound Care Orders initiated by RN: No    Pressure Injury (Stage 3,4, Unstageable, DTI, NWPT, and Complex wounds) if present, place Wound referral order by RN under : No    New Ostomies, if present place, Ostomy referral order under : No     Nurse 1 eSignature: Electronically signed by Iva Paniagua RN on 11/11/24 at 5:06 PM EST    **SHARE this note so that the co-signing nurse can place an eSignature**    Nurse 2 eSignature: Electronically signed by Yovanny Rao RN on 11/11/24 at 5:33 PM EST

## 2024-11-11 NOTE — PROGRESS NOTES
Internal Medicine ICU Progress Note      Events of Last 24 hours:     Admitted for seizures   Patient was agitated and on Precedex.  EEG showed no seizure activity.  Parents at bedside.    11/10-became agitated yesterday.  Received additional Ativan.  Had 1 episode of extreme agitation and violent behavior and security was called into the room.      - pt seen drowsy, eyes closed but answers questions  Was removing lines and wires this am and remains in restraints  Much calm today   No more seziures  Reports he drinks way too much    Invasive Lines: PIV        MV:  n/a    No results for input(s): \"PHART\", \"[id number]\", \"PO2ART\" in the last 72 hours.      IV:   dexmedeTOMIDine HCl in NaCl 0.8 mcg/kg/hr (24 0649)    sodium chloride         Vitals:  Temp  Av.4 °F (36.3 °C)  Min: 97 °F (36.1 °C)  Max: 97.9 °F (36.6 °C)  Pulse  Av.8  Min: 66  Max: 99  BP  Min: 92/68  Max: 117/94  SpO2  Av %  Min: 92 %  Max: 100 %  Patient Vitals for the past 4 hrs:   BP Pulse Resp Weight   24 0700 98/70 68 14 --   24 0600 97/68 70 13 --   24 0526 -- -- -- 67.4 kg (148 lb 11.2 oz)   24 0500 95/71 72 14 --       CVP:        Intake/Output Summary (Last 24 hours) at 2024 0809  Last data filed at 2024 0649  Gross per 24 hour   Intake 315.46 ml   Output 950 ml   Net -634.54 ml       EXAM:        General:  young male drowsy but arousable eyes closed  Awake, alert and appears oriented. Appears to be not in any distress  Mucous Membranes:  Pink , anicteric  Neck: No JVD, no carotid bruit, no thyromegaly  Chest:  Clear to auscultation bilaterally, no added sounds  Cardiovascular:  RRR S1S2 heard, no murmurs or gallops  Abdomen:  Soft, undistended, non tender, no organomegaly, BS present  Extremities: No edema or cyanosis. Distal pulses well felt  Neurological : calm and cooperative at this time, remains in restraints, answering some questions appropriately although voice is

## 2024-11-11 NOTE — PROGRESS NOTES
Shift assessment was completed (see flow sheet). Pt is A/O to self- barely answering questions, garbled speech at times. Pt denies any pain or other needs at this time.     Respirations are even, unlabored, with clear sounds. On RA.  Scheduled medications to follow- whole with Water. (Patient not opening eyes to follow directions, instead open mouth).     Infusing:  Precedex (See MAR).     CIWA 4    Call light within reach. Bed in lowest position. Bed alarm on.     Patient is not able to demonstrated the ability to move from a reclining position to an upright position within the recliner. Patient is confused, demented and /or unable to follow instruction.

## 2024-11-11 NOTE — PROGRESS NOTES
Reassessment completed (see Flowsheet). All ICU lines remain intact, ICU monitoring continued-     Infusing:  Precedex (See MAR),    Pt is drowsy- awakens to voice. Oriented to self/ time     Lung sounds clear.    CIWA 3  Pt's blood pressures WDL.

## 2024-11-11 NOTE — PROGRESS NOTES
Reassessment completed (see Flowsheet). All ICU lines remain intact, ICU monitoring continued-     Infusing:  Precedex (See MAR).     Pt is A/O and calm at this time.. Parents at bedside.    Lung sounds Clear  Pt's blood pressures WDL.

## 2024-11-12 LAB
ANION GAP SERPL CALCULATED.3IONS-SCNC: 12 MMOL/L (ref 3–16)
BASOPHILS # BLD: 0 K/UL (ref 0–0.2)
BASOPHILS NFR BLD: 0.3 %
BUN SERPL-MCNC: 6 MG/DL (ref 7–20)
CALCIUM SERPL-MCNC: 8.6 MG/DL (ref 8.3–10.6)
CHLORIDE SERPL-SCNC: 96 MMOL/L (ref 99–110)
CO2 SERPL-SCNC: 25 MMOL/L (ref 21–32)
CREAT SERPL-MCNC: 0.5 MG/DL (ref 0.9–1.3)
DEPRECATED RDW RBC AUTO: 14.6 % (ref 12.4–15.4)
EOSINOPHIL # BLD: 0 K/UL (ref 0–0.6)
EOSINOPHIL NFR BLD: 0.7 %
GFR SERPLBLD CREATININE-BSD FMLA CKD-EPI: >90 ML/MIN/{1.73_M2}
GLUCOSE SERPL-MCNC: 96 MG/DL (ref 70–99)
HCT VFR BLD AUTO: 40 % (ref 40.5–52.5)
HGB BLD-MCNC: 13.8 G/DL (ref 13.5–17.5)
LYMPHOCYTES # BLD: 0.7 K/UL (ref 1–5.1)
LYMPHOCYTES NFR BLD: 11.7 %
MAGNESIUM SERPL-MCNC: 1.84 MG/DL (ref 1.8–2.4)
MCH RBC QN AUTO: 31.4 PG (ref 26–34)
MCHC RBC AUTO-ENTMCNC: 34.5 G/DL (ref 31–36)
MCV RBC AUTO: 91.3 FL (ref 80–100)
MONOCYTES # BLD: 1.2 K/UL (ref 0–1.3)
MONOCYTES NFR BLD: 18.9 %
NEUTROPHILS # BLD: 4.4 K/UL (ref 1.7–7.7)
NEUTROPHILS NFR BLD: 68.4 %
PLATELET # BLD AUTO: 136 K/UL (ref 135–450)
PMV BLD AUTO: 9.3 FL (ref 5–10.5)
POTASSIUM SERPL-SCNC: 3.2 MMOL/L (ref 3.5–5.1)
RBC # BLD AUTO: 4.39 M/UL (ref 4.2–5.9)
SODIUM SERPL-SCNC: 133 MMOL/L (ref 136–145)
WBC # BLD AUTO: 6.4 K/UL (ref 4–11)

## 2024-11-12 PROCEDURE — 99232 SBSQ HOSP IP/OBS MODERATE 35: CPT | Performed by: INTERNAL MEDICINE

## 2024-11-12 PROCEDURE — 97530 THERAPEUTIC ACTIVITIES: CPT

## 2024-11-12 PROCEDURE — C9254 INJECTION, LACOSAMIDE: HCPCS | Performed by: PSYCHIATRY & NEUROLOGY

## 2024-11-12 PROCEDURE — 99233 SBSQ HOSP IP/OBS HIGH 50: CPT | Performed by: INTERNAL MEDICINE

## 2024-11-12 PROCEDURE — 97162 PT EVAL MOD COMPLEX 30 MIN: CPT

## 2024-11-12 PROCEDURE — 2500000003 HC RX 250 WO HCPCS: Performed by: INTERNAL MEDICINE

## 2024-11-12 PROCEDURE — 6360000002 HC RX W HCPCS: Performed by: PSYCHIATRY & NEUROLOGY

## 2024-11-12 PROCEDURE — 6360000002 HC RX W HCPCS: Performed by: INTERNAL MEDICINE

## 2024-11-12 PROCEDURE — 85025 COMPLETE CBC W/AUTO DIFF WBC: CPT

## 2024-11-12 PROCEDURE — 97535 SELF CARE MNGMENT TRAINING: CPT

## 2024-11-12 PROCEDURE — 2000000000 HC ICU R&B

## 2024-11-12 PROCEDURE — 2580000003 HC RX 258: Performed by: FAMILY MEDICINE

## 2024-11-12 PROCEDURE — 6370000000 HC RX 637 (ALT 250 FOR IP): Performed by: PSYCHIATRY & NEUROLOGY

## 2024-11-12 PROCEDURE — 36415 COLL VENOUS BLD VENIPUNCTURE: CPT

## 2024-11-12 PROCEDURE — 6370000000 HC RX 637 (ALT 250 FOR IP): Performed by: FAMILY MEDICINE

## 2024-11-12 PROCEDURE — 80048 BASIC METABOLIC PNL TOTAL CA: CPT

## 2024-11-12 PROCEDURE — 6370000000 HC RX 637 (ALT 250 FOR IP): Performed by: INTERNAL MEDICINE

## 2024-11-12 PROCEDURE — 83735 ASSAY OF MAGNESIUM: CPT

## 2024-11-12 PROCEDURE — 97166 OT EVAL MOD COMPLEX 45 MIN: CPT

## 2024-11-12 RX ORDER — POTASSIUM CHLORIDE 750 MG/1
40 TABLET, EXTENDED RELEASE ORAL ONCE
Status: COMPLETED | OUTPATIENT
Start: 2024-11-12 | End: 2024-11-12

## 2024-11-12 RX ORDER — POTASSIUM CHLORIDE 750 MG/1
20 TABLET, EXTENDED RELEASE ORAL ONCE
Status: COMPLETED | OUTPATIENT
Start: 2024-11-12 | End: 2024-11-12

## 2024-11-12 RX ADMIN — ENOXAPARIN SODIUM 40 MG: 100 INJECTION SUBCUTANEOUS at 08:05

## 2024-11-12 RX ADMIN — Medication 10 ML: at 08:06

## 2024-11-12 RX ADMIN — CHLORDIAZEPOXIDE HYDROCHLORIDE 50 MG: 25 CAPSULE ORAL at 08:05

## 2024-11-12 RX ADMIN — PREGABALIN 75 MG: 25 CAPSULE ORAL at 08:05

## 2024-11-12 RX ADMIN — FOLIC ACID 1 MG: 1 TABLET ORAL at 08:06

## 2024-11-12 RX ADMIN — MUPIROCIN: 20 OINTMENT TOPICAL at 08:05

## 2024-11-12 RX ADMIN — LORAZEPAM 2 MG: 2 TABLET ORAL at 22:58

## 2024-11-12 RX ADMIN — MUPIROCIN: 20 OINTMENT TOPICAL at 20:07

## 2024-11-12 RX ADMIN — POTASSIUM CHLORIDE 40 MEQ: 750 TABLET, EXTENDED RELEASE ORAL at 10:19

## 2024-11-12 RX ADMIN — Medication 100 MG: at 08:06

## 2024-11-12 RX ADMIN — LACOSAMIDE 100 MG: 10 INJECTION INTRAVENOUS at 20:03

## 2024-11-12 RX ADMIN — LACOSAMIDE 100 MG: 10 INJECTION INTRAVENOUS at 08:05

## 2024-11-12 RX ADMIN — CHLORDIAZEPOXIDE HYDROCHLORIDE 50 MG: 25 CAPSULE ORAL at 20:03

## 2024-11-12 RX ADMIN — LORAZEPAM 2 MG: 2 TABLET ORAL at 18:11

## 2024-11-12 RX ADMIN — Medication 10 ML: at 20:04

## 2024-11-12 RX ADMIN — DEXMEDETOMIDINE HYDROCHLORIDE IN SODIUM CHLORIDE 0.6 MCG/KG/HR: 4 INJECTION INTRAVENOUS at 00:46

## 2024-11-12 RX ADMIN — POTASSIUM CHLORIDE 20 MEQ: 750 TABLET, EXTENDED RELEASE ORAL at 14:30

## 2024-11-12 RX ADMIN — CHLORDIAZEPOXIDE HYDROCHLORIDE 50 MG: 25 CAPSULE ORAL at 14:30

## 2024-11-12 RX ADMIN — Medication 1 TABLET: at 08:06

## 2024-11-12 RX ADMIN — PREGABALIN 75 MG: 25 CAPSULE ORAL at 20:03

## 2024-11-12 RX ADMIN — CHLORDIAZEPOXIDE HYDROCHLORIDE 50 MG: 25 CAPSULE ORAL at 03:03

## 2024-11-12 NOTE — CARE COORDINATION
Met with family at their request. Family has decided to [facility] as the patient does not want to be far from home.

## 2024-11-12 NOTE — PROGRESS NOTES
Care rounds completed with Dr. Garcia and multidisciplinary team. Reviewed labs, meds, VS, assessment, & plan of care for today. See dictated note and new orders for details.     Give 60 meq PO potassium  Attempt to keep off precedex  CM to discuss IP rehab options with parents

## 2024-11-12 NOTE — PROGRESS NOTES
4 Eyes Skin Assessment     NAME:  Fernando Abdullahi  YOB: 1995  MEDICAL RECORD NUMBER:  [id number]    The patient is being assessed for  Other shift assessment     I agree that at least one RN has performed a thorough Head to Toe Skin Assessment on the patient. ALL assessment sites listed below have been assessed.      Areas assessed by both nurses:    Head, Face, Ears, Shoulders, Back, Chest, Arms, Elbows, Hands, and Legs. Feet and Heels        Does the Patient have a Wound?        Antonio Prevention initiated by RN: No  Wound Care Orders initiated by RN: No    Pressure Injury (Stage 3,4, Unstageable, DTI, NWPT, and Complex wounds) if present, place Wound referral order by RN under : No    New Ostomies, if present place, Ostomy referral order under : No     Nurse 1 eSignature: Electronically signed by Marilyn Murry RN on 11/12/24 at 2:50 AM EST    **SHARE this note so that the co-signing nurse can place an eSignature**    Nurse 2 eSignature: Electronically signed by Radha Vázquez RN on 11/12/24 at 2:51 AM EST

## 2024-11-12 NOTE — PROGRESS NOTES
Pt noted with increase anxiety and agitation trying to get leg out bed. assessed CWIA pt scored 8.Gave ativan per PRN orders

## 2024-11-12 NOTE — PROGRESS NOTES
Fernando Abdullahi  Neurology Follow-up  [facility] Neurology    Date of Service: 11/12/2024    Subjective:   CC: Follow up today regarding: New onset seizures     Events noted. Chart and lab reviewed.       ROS : A 10-12 system review obtained and updated today and is unremarkable except as mentioned  in my interval history.     Past medical history, social history, medication and family history reviewed.       Objective:  Exam:   Constitutional:   Vitals:    11/12/24 1000 11/12/24 1100 11/12/24 1200 11/12/24 1300   BP:       Pulse: 78 82 (!) 101 (!) 108   Resp: 16 19 19 21   Temp:       TempSrc:       SpO2:       Weight:       Height:         General appearance:  Normal development and appear in no acute distress.   Mental Status:   Orientation to person, place, time, situation  Memory good immediate recall and intact remote memory    Attention intact as able to attend well to the exam     Language fluent in conversation   Comprehension intact; follows simple commands    Cranial Nerves:   II: Visual fields: Full without extinction on confrontational testing.   III,IV,VI: Pupils: equal, round, reactive to light, bilaterally; Extra Ocular Movements are intact. No nystagmus  V: Facial sensation is intact to pin prick and light touch   VII: Facial strength and movements: intact and symmetric  VIII: Hearing: Intact to finger rub bilaterally   IX, XI: Normal palatal elevation and shoulder shrug  XII: Tongue movements are normal; tongue midline with protrusion   Musculoskeletal: 5/5 in all 4 extremities.  Good range of motion.  No muscle atrophy.    Tone: Normal tone.   Reflexes: Bilateral biceps 2/4, triceps 2/4, brachial radialis 2/4, patellar 2/4   Planters: Flexor bilaterally  Coordination: no pronator drift, no dysmetria with FNF and normal REM  Sensation: normal in both arms and legs.  Gait/Posture: steady gait with normal posturing and station.     MDM:  A. Problems (any 1)    High:    [x] Acute/Chronic  Illness/injury posing threat to life or bodily function:    [] Severe exacerbation of chronic illness:      Moderate:    []     1 or more chronic illness with exacerbation, progression or side effect of treatment or  []     2 or more stable chronic illnesses or  []     1 acute illness with systemic symptoms     ---------------------------------------------------------------------  B. Risk of Treatment (any 1)   [] Drugs/treatments that require intensive monitoring for toxicity include:    [] Change in code status:    [] Decision to escalate care:    [] Major surgery/procedure with associated risk factors:    [x] Prescription drug management  ----------------------------------------------------------------------  [x] High (any 2)  [] Moderate (any 1)    C. Data (any 2 for high and any 1 for moderate)  [x] Discussed management of the case with: family   [x] Imaging personally reviewed and interpreted  [x] Data Review (any 3)  [x] Collateral history obtained from: family   [x] All available Consultant notes from yesterday/today were reviewed  [x] All current labs were reviewed and interpreted for clinical significance   [x] Appropriate follow-up labs were ordered      Data  LABS:   Lab Results   Component Value Date/Time     11/12/2024 07:40 AM    K 3.2 11/12/2024 07:40 AM    CL 96 11/12/2024 07:40 AM    CO2 25 11/12/2024 07:40 AM    BUN 6 11/12/2024 07:40 AM    CREATININE 0.5 11/12/2024 07:40 AM    LABGLOM >90 11/12/2024 07:40 AM    GLUCOSE 96 11/12/2024 07:40 AM    MG 1.84 11/12/2024 07:40 AM    CALCIUM 8.6 11/12/2024 07:40 AM     Lab Results   Component Value Date/Time    WBC 6.4 11/12/2024 07:40 AM    RBC 4.39 11/12/2024 07:40 AM    HGB 13.8 11/12/2024 07:40 AM    HCT 40.0 11/12/2024 07:40 AM    MCV 91.3 11/12/2024 07:40 AM    RDW 14.6 11/12/2024 07:40 AM     11/12/2024 07:40 AM   No results found for: \"INR\", \"PROTIME\"    Neuroimaging was independently reviewed by me and discussed results with the

## 2024-11-12 NOTE — PROGRESS NOTES
Reassessment completed, see flowsheets.  VSS. Pt remains calm and cooperative. On and off agitation.     Parents at bedside. All ICU Lines and monitoring remain in place. Bed locked in lowest position. Call light within reach.

## 2024-11-12 NOTE — PROGRESS NOTES
Pt alert x 3, able to answer questions noted speech can be garbled at times.pt denies complaints at this. HS med's taken without issue. Male wick in place and draining virgil urine. Bi lat wrist restraints in place.   Call light and bedside table in reach.

## 2024-11-12 NOTE — PROGRESS NOTES
Reassessment completed (see Flowsheet )    All ICU lines and monitoring  in place . Infusing precedex at 0.6 mcg/kg/hr . Male wick in place. Call light and bed alarm in place.

## 2024-11-12 NOTE — PROGRESS NOTES
Reassessment completed, see flowsheets.  VSS. Pt remains calm and cooperative. CIWA 3    Pt off precedex since 0845. Out of BSWR and will attempt to be out of posey belt  Parents at bedside. All ICU Lines and monitoring remain in place. Bed locked in lowest position. Call light within reach.

## 2024-11-12 NOTE — PROGRESS NOTES
Tell sitter called and reported pt pulling at lines and taking off clothes. Spoke with pt tried to redirect with no change in behavior . Increased pt precedex to 0.6 mcg/kg/hr.

## 2024-11-12 NOTE — FLOWSHEET NOTE
11/12/24 1811   CIWA-Ar   Nausea and Vomiting 0   Tactile Disturbances 2   Tremor 3   Auditory Disturbances 0   Paroxysmal Sweats 0   Visual Disturbances 3   Anxiety 0   Headache, Fullness in Head 1   Agitation 3   Orientation and Clouding of Sensorium 0   CIWA-Ar Total (!) 12     Gave prn ativan 2 mg per order, see MAR

## 2024-11-12 NOTE — PROGRESS NOTES
Shift handoff report given to Marilyn ARCHULETA at bedside.   Pt is Awake and alert  IV handoff completed.   Call light within reach, bed in lowest position, bed alarm on. End of shift checks completed.    Pt has been free of falls for duration of shift. .

## 2024-11-12 NOTE — PROGRESS NOTES
Inpatient Physical Therapy Evaluation & Treatment    Unit: ICU  Date:  11/12/2024  Patient Name:    Fernando Abdullahi  Admitting diagnosis:  Seizure (HCC) [R56.9]  Alcohol withdrawal syndrome with complication (HCC) [F10.939]  Admit Date:  11/7/2024  Precautions/Restrictions/WB Status/ Lines/ Wounds/ Oxygen: Fall risk, Bed/chair alarm, Lines (IV and external catheter), Telemetry, and Telesitter      Pt seen for cotreatment this date due to patient safety, patient endurance, and limited functional status information    Treatment Time:  2436-1969  Treatment Number:  1   Timed Code Treatment Minutes: 32 minutes  Total Treatment Minutes:  42  minutes    Patient Stated Goals for Therapy: none stated          Discharge Recommendations: Home with   24 hr assistance and Home PT, continue to assess  DME needs for discharge:  continue to assess       Therapy recommendation for EMS Transport: can transport by wheelchair    Therapy recommendations for staff:   Assist of 2 for ambulation with use of rolling walker (RW) and gait belt to/from BSC  to/from chair    History of Present Illness:    Per Dr. Whitley H&P 11/8/24:  \"29-year-old male with a history of alcohol abuse presented to the emergency room due to an observed seizure at home.  He had 2 more seizures while in the emergency room.  Given IV Ativan.  Admitted to the ICU.  Has not had any further seizures\"     Preadmission Environment:   Pt. Lives                                              alone  Home environment:                            apartment , ground floor   Steps to enter first floor:                     No steps  Steps to second floor/basement:        N/A  Laundry:                                              1st floor  Bathroom:                                           tub/shower unit, grab bars in shower, and standard height toilet  Pt sleeps in a:                                     Flat bed  Equipment owned:                              none    completed as indicated below  Distance:      10 ft, 10 ft   Deviations (firm surface/linoleum):  decreased rishi, deviated path, and decreased step length bilaterally  Assistive Device Used:    gait belt and rolling walker (RW)  Level of Assist:    Min A    Comment: unsteady during ambulation with RW around foot of bed x 2    Stair Training deferred, pt unsafe/ not appropriate to complete stairs at this time    Therapeutic Exercises Initiated  deferred secondary to treatment focus on functional mobility  Supine:  N/A    Seated:  N/A    Standing:  N/A    Positioning Needs   Pt in bed, alarm set, and ICU monitoring  Call light provided and all needs within reach  RN aware of pt position/status  Telesitter present in room    Other Activities  See OT note.     Patient/Family Education   Pt educated on role of inpatient PT, POC, importance of continued activity, DC recommendations, functional transfer/mobility safety, and calling for assist with mobility.    Assessment  Pt seen today for physical therapy Evaluation & Treatment. Pt demonstrated decreased Activity tolerance, Balance, Safety, and Strength as well as decreased independence with Ambulation, Bed Mobility , and Transfers. Prior to admission Pt indep with functional mobility. Today's treatment consisted of bed mobility, transfers, and ambulation in order to address the above impairments and functional limitations. Pt required min A for STS and ambulating with RW to maintain balance d/t unsteady during ambulation around bed. Continued skilled physical therapy is necessary to address the above impairments, in order to help the patient make a full return to PLOF without complaints of pain, difficulty or compensation      Recommending Home 24 hr assist and with home PT upon discharge as patient functioning below baseline level and would benefit from continued therapy services    Goals :   To be met in 3 visits:  1). Independent with LE Ex x 10 reps  2). Sit

## 2024-11-12 NOTE — PROGRESS NOTES
Pulmonary & Critical Care Medicine ICU Progress Note    CC: ETOH w/d and seizure     Events of Last 24 hours: Precedex off      Vitals:  BP (!) 118/94   Pulse 82   Temp 97.3 °F (36.3 °C) (Temporal)   Resp 17   Ht 1.854 m (6' 1\")   Wt 65.8 kg (145 lb)   SpO2 100%   BMI 19.13 kg/m²    Constitutional:  No acute distress.   Eyes: PERRL. Conjunctivae anicteric.   ENT: Normal nose. Normal tongue.    Neck:  Trachea is midline.   Respiratory: No accessory muscle usage.  Decreased breath sounds. No wheezes. No rales. No Rhonchi.  Cardiovascular: Normal S1S2. No digit clubbing. No digit cyanosis. No LE edema.   Psychiatric: No anxiety or Agitation. Alert and Oriented to person, place and time.     Scheduled Meds:   enoxaparin  40 mg SubCUTAneous Daily    nicotine  1 patch TransDERmal Daily    lacosamide  100 mg IntraVENous BID    sodium chloride flush  5-40 mL IntraVENous 2 times per day    thiamine  100 mg Oral Daily    mupirocin   Each Nostril BID    folic acid  1 mg Oral Daily    therapeutic multivitamin-minerals  1 tablet Oral Daily    pregabalin  75 mg Oral BID    chlordiazePOXIDE  50 mg Oral Q6H     PRN Meds:  sodium chloride flush, sodium chloride, potassium chloride **OR** potassium chloride, magnesium sulfate, ondansetron **OR** ondansetron, polyethylene glycol, acetaminophen **OR** acetaminophen, LORazepam **OR** LORazepam **OR** LORazepam **OR** LORazepam **OR** LORazepam **OR** LORazepam **OR** LORazepam **OR** LORazepam, morphine    Results:  CBC:   Recent Labs     11/10/24  0443 11/11/24  0423 11/12/24  0740   WBC 8.1 8.0 6.4   HGB 14.2 13.5 13.8   HCT 42.0 39.3* 40.0*   MCV 90.2 90.0 91.3   PLT see below 106* 136     BMP:   Recent Labs     11/10/24  0443 11/12/24  0740   * 133*   K 3.8 3.2*   CL 93* 96*   CO2 21 25   BUN 9 6*   CREATININE 0.5* 0.5*     LIVER PROFILE:   No results for input(s): \"AST\", \"ALT\", \"LIPASE\", \"AMYLASE\", \"BILIDIR\", \"BILITOT\", \"ALKPHOS\" in the last 72 hours.    Invalid

## 2024-11-12 NOTE — PROGRESS NOTES
Internal Medicine ICU Progress Note      Events of Last 24 hours:     Admitted for seizures   Patient was agitated and on Precedex.  EEG showed no seizure activity.  Parents at bedside.    11/10-became agitated yesterday.  Received additional Ativan.  Had 1 episode of extreme agitation and violent behavior and security was called into the room.      - pt seen drowsy,  arousable   Remains on precedex gtt low dose overnight , off ext restraints but remains in posey  Calm and cooperative  Parents at bedside    No more seziures      Invasive Lines: PIV        MV:  n/a    No results for input(s): \"PHART\", \"BHK3ZHO\", \"PO2ART\" in the last 72 hours.      IV:   dexmedeTOMIDine HCl in NaCl 0.3 mcg/kg/hr (24 0551)    sodium chloride         Vitals:  Temp  Av.2 °F (36.8 °C)  Min: 97.3 °F (36.3 °C)  Max: 99.4 °F (37.4 °C)  Pulse  Av.3  Min: 66  Max: 107  BP  Min: 97/78  Max: 132/80  SpO2  Av.6 %  Min: 96 %  Max: 100 %  Patient Vitals for the past 4 hrs:   BP Temp Temp src Pulse Resp SpO2 Weight   24 0800 107/66 97.3 °F (36.3 °C) Temporal 80 26 -- --   24 0700 119/84 -- -- 74 15 100 % --   24 0600 117/85 -- -- 72 15 98 % 65.8 kg (145 lb)       CVP:        Intake/Output Summary (Last 24 hours) at 2024 0900  Last data filed at 2024 0806  Gross per 24 hour   Intake 632.67 ml   Output 2700 ml   Net -2067.33 ml       EXAM:        General:  young male drowsy but arousable   Awake, alert and appears oriented. Appears to be not in any distress  Mucous Membranes:  Pink , anicteric  Neck: No JVD, no carotid bruit, no thyromegaly  Chest:  Clear to auscultation bilaterally, no added sounds  Cardiovascular:  RRR S1S2 heard, no murmurs or gallops  Abdomen:  Soft, undistended, non tender, no organomegaly, BS present  Extremities: No edema or cyanosis. Distal pulses well felt  Neurological : calm and cooperative at this time, remains in restraints, answering some questions appropriately

## 2024-11-12 NOTE — PLAN OF CARE
Problem: Discharge Planning  Goal: Discharge to home or other facility with appropriate resources  Outcome: Progressing     Problem: Safety - Adult  Goal: Free from fall injury  Outcome: Progressing     Problem: Safety - Medical Restraint  Goal: Remains free of injury from restraints (Restraint for Interference with Medical Device)  Description: INTERVENTIONS:  1. Determine that other, less restrictive measures have been tried or would not be effective before applying the restraint  2. Evaluate the patient's condition at the time of restraint application  3. Inform patient/family regarding the reason for restraint  4. Q2H: Monitor safety, psychosocial status, comfort, nutrition and hydration  Outcome: Progressing  Flowsheets  Taken 11/11/2024 1600 by Iva Paniagua RN  Remains free of injury from restraints (restraint for interference with medical device): Every 2 hours: Monitor safety, psychosocial status, comfort, nutrition and hydration  Taken 11/11/2024 1400 by Iva Paniagua RN  Remains free of injury from restraints (restraint for interference with medical device): Determine that other, less restrictive measures have been tried or would not be effective before applying the restraint     Problem: Confusion  Goal: Confusion, delirium, dementia, or psychosis is improved or at baseline  Description: INTERVENTIONS:  1. Assess for possible contributors to thought disturbance, including medications, impaired vision or hearing, underlying metabolic abnormalities, dehydration, psychiatric diagnoses, and notify attending LIP  2. [place] high risk fall precautions, as indicated  3. Provide frequent short contacts to provide reality reorientation, refocusing and direction  4. Decrease environmental stimuli, including noise as appropriate  5. Monitor and intervene to maintain adequate nutrition, hydration, elimination, sleep and activity  6. If unable to ensure safety without constant attention obtain sitter and review  sitter guidelines with assigned personnel  7. Initiate Psychosocial CNS and Spiritual Care consult, as indicated  Outcome: Progressing

## 2024-11-12 NOTE — PROGRESS NOTES
Shift assessment, completed, see flow sheet. Pt is alert and oriented x 3. Following commands. CIWA 3 currently. Currently with slight tremors to touch and fullness in head.    SR 75, /66 (80), SpO2 97%. Respirations are easy, even, and unlabored. Bilateral lung sounds clear. 2 PIVs, WNL with precedex 0.3 mcg/kg/hr infusing.     External catheter in place, CLWS.    Call light within reach. Bed in lowest position. Bed alarm on.

## 2024-11-12 NOTE — PROGRESS NOTES
Inpatient Occupational Therapy Evaluation & Treatment    Unit: ICU  Date:  11/12/2024  Patient Name:    Fernando Abdullahi  Admitting diagnosis:  Seizure (HCC) [R56.9]  Alcohol withdrawal syndrome with complication (HCC) [F10.939]  Admit Date:  11/7/2024  Precautions/Restrictions/WB Status/ Lines/ Wounds/ Oxygen: Fall risk, Bed/chair alarm, Lines (IV and external catheter), Telemetry, Telesitter, and Restraints (posey belt)    Pt seen for cotreatment this date due to patient safety, patient endurance, acute illness/injury, and limited functional status information    Treatment Time:  0300-5383  Treatment Number:  1  Timed Code Treatment Minutes: 30 minutes  Total Treatment Minutes:  40  minutes    Patient Goals for Therapy: none stated          Discharge Recommendations: Home with   24/7 assist  and Home OT; continue to assess  DME needs for discharge: RW       Therapy recommendations for staff:   Assist of 1 for transfers with use of rolling walker (RW) and gait belt to/from BSC  to/from chair    History of Present Illness: Per Dr. Whitley H&P 11/8/24:  \"29-year-old male with a history of alcohol abuse presented to the emergency room due to an observed seizure at home.  He had 2 more seizures while in the emergency room.  Given IV Ativan.  Admitted to the ICU.  Has not had any further seizures\"    Preadmission Environment:   Pt. Lives     alone  Home environment:    apartment , ground floor   Steps to enter first floor:   No steps  Steps to second floor/basement: N/A  Laundry:     1st floor  Bathroom:     tub/shower unit, grab bars in shower, and standard height toilet  Pt sleeps in a:    Flat bed  Equipment owned:    none    Preadmission Status:  Pt. Able to drive:    Yes  Pt. Fully independent with ADLs:  Yes  Pt. Required assistance for:   Independent PTA  Pt. independent for functional transfers and utilized No Device for mobility in home and No Device out in community  History of falls:   No  Home Health  to don/doff sock while seated at EOB; somewhat extended time required due to mild decreased fine motor coordination    Bathing:    UE:  Not Tested  LE:  Not Tested    Eating:   Not Tested    Toileting:  Not Tested    Grooming/hygiene: Supervision to brush hair and wipe face with washcloth    Ther Ex / Activities Initiated:   N/A    Positioning Needs:   Pt in bed, ICU monitoring, and posey belt placed  Call light provided and all needs within reach    Patient/Family Education:   Pt educated on role of inpatient OT, plan of care, importance of continued activity, DC recommendations, functional transfer/mobility safety, and calling for assist with mobility    CHF Education  N/A    Assessment:  Pt seen for occupational therapy evaluation in the acute care setting this date.  Pt demonstrated decreased Activity tolerance, ADLs, Balance , Bathing, Dressing, Strength, and Transfers. Pt displays decreased balance and reports feeling that his legs are somewhat weak and shaky.  This decreased balance impacts his ability to complete standing level ADLs independently and safely.  Pt also demonstrates mildly decreased fine motor coordination, although with extended time was able to complete basic ADLs at seated level without physical assistance.  Pt functioning below baseline and will likely benefit from skilled occupational therapy services to maximize safety and independence.     Recommending Home 24 hr assist and with home OT upon discharge as patient functioning below baseline level and would benefit from continued therapy services    Goal(s) :   To be met in 3 Visits:  Bed to toilet/BSC:       SBA    To be met in 5 Visits:  Supine to/from Sit in preparation for ADL task:   Independent  Toileting        SBA  Grooming       Independent  Upper Body Dressing:      Independent  Lower Body Dressing:      SBA  Pt to demonstrate UE therapeutic exs x 15 reps with minimal cues    Rehabilitation Potential: Good  Strengths for achieving

## 2024-11-13 LAB
ANION GAP SERPL CALCULATED.3IONS-SCNC: 11 MMOL/L (ref 3–16)
BASOPHILS # BLD: 0 K/UL (ref 0–0.2)
BASOPHILS NFR BLD: 0.7 %
BUN SERPL-MCNC: 6 MG/DL (ref 7–20)
CALCIUM SERPL-MCNC: 8.5 MG/DL (ref 8.3–10.6)
CHLORIDE SERPL-SCNC: 99 MMOL/L (ref 99–110)
CO2 SERPL-SCNC: 24 MMOL/L (ref 21–32)
CREAT SERPL-MCNC: 0.5 MG/DL (ref 0.9–1.3)
DEPRECATED RDW RBC AUTO: 14.4 % (ref 12.4–15.4)
EOSINOPHIL # BLD: 0 K/UL (ref 0–0.6)
EOSINOPHIL NFR BLD: 0.7 %
GFR SERPLBLD CREATININE-BSD FMLA CKD-EPI: >90 ML/MIN/{1.73_M2}
GLUCOSE SERPL-MCNC: 94 MG/DL (ref 70–99)
HCT VFR BLD AUTO: 35.9 % (ref 40.5–52.5)
HGB BLD-MCNC: 12.2 G/DL (ref 13.5–17.5)
LYMPHOCYTES # BLD: 0.7 K/UL (ref 1–5.1)
LYMPHOCYTES NFR BLD: 13.5 %
MAGNESIUM SERPL-MCNC: 1.69 MG/DL (ref 1.8–2.4)
MCH RBC QN AUTO: 30.4 PG (ref 26–34)
MCHC RBC AUTO-ENTMCNC: 34 G/DL (ref 31–36)
MCV RBC AUTO: 89.3 FL (ref 80–100)
MONOCYTES # BLD: 0.9 K/UL (ref 0–1.3)
MONOCYTES NFR BLD: 18.8 %
NEUTROPHILS # BLD: 3.2 K/UL (ref 1.7–7.7)
NEUTROPHILS NFR BLD: 66.3 %
PLATELET # BLD AUTO: 189 K/UL (ref 135–450)
PLATELET BLD QL SMEAR: ADEQUATE
PMV BLD AUTO: 8.8 FL (ref 5–10.5)
POTASSIUM SERPL-SCNC: 3.4 MMOL/L (ref 3.5–5.1)
POTASSIUM SERPL-SCNC: 3.6 MMOL/L (ref 3.5–5.1)
RBC # BLD AUTO: 4.02 M/UL (ref 4.2–5.9)
RBC MORPH BLD: NORMAL
SLIDE REVIEW: ABNORMAL
SODIUM SERPL-SCNC: 134 MMOL/L (ref 136–145)
WBC # BLD AUTO: 4.9 K/UL (ref 4–11)

## 2024-11-13 PROCEDURE — C9254 INJECTION, LACOSAMIDE: HCPCS | Performed by: PSYCHIATRY & NEUROLOGY

## 2024-11-13 PROCEDURE — 99232 SBSQ HOSP IP/OBS MODERATE 35: CPT | Performed by: INTERNAL MEDICINE

## 2024-11-13 PROCEDURE — 6360000002 HC RX W HCPCS: Performed by: INTERNAL MEDICINE

## 2024-11-13 PROCEDURE — 2000000000 HC ICU R&B

## 2024-11-13 PROCEDURE — 2580000003 HC RX 258: Performed by: FAMILY MEDICINE

## 2024-11-13 PROCEDURE — 99233 SBSQ HOSP IP/OBS HIGH 50: CPT | Performed by: INTERNAL MEDICINE

## 2024-11-13 PROCEDURE — 83735 ASSAY OF MAGNESIUM: CPT

## 2024-11-13 PROCEDURE — 97112 NEUROMUSCULAR REEDUCATION: CPT

## 2024-11-13 PROCEDURE — 6360000002 HC RX W HCPCS: Performed by: FAMILY MEDICINE

## 2024-11-13 PROCEDURE — 97530 THERAPEUTIC ACTIVITIES: CPT

## 2024-11-13 PROCEDURE — 97110 THERAPEUTIC EXERCISES: CPT

## 2024-11-13 PROCEDURE — 36415 COLL VENOUS BLD VENIPUNCTURE: CPT

## 2024-11-13 PROCEDURE — 6370000000 HC RX 637 (ALT 250 FOR IP): Performed by: INTERNAL MEDICINE

## 2024-11-13 PROCEDURE — 6370000000 HC RX 637 (ALT 250 FOR IP): Performed by: PSYCHIATRY & NEUROLOGY

## 2024-11-13 PROCEDURE — 6360000002 HC RX W HCPCS: Performed by: PSYCHIATRY & NEUROLOGY

## 2024-11-13 PROCEDURE — 84132 ASSAY OF SERUM POTASSIUM: CPT

## 2024-11-13 PROCEDURE — 85025 COMPLETE CBC W/AUTO DIFF WBC: CPT

## 2024-11-13 PROCEDURE — 80048 BASIC METABOLIC PNL TOTAL CA: CPT

## 2024-11-13 PROCEDURE — 97116 GAIT TRAINING THERAPY: CPT

## 2024-11-13 PROCEDURE — 6370000000 HC RX 637 (ALT 250 FOR IP): Performed by: FAMILY MEDICINE

## 2024-11-13 PROCEDURE — 97535 SELF CARE MNGMENT TRAINING: CPT

## 2024-11-13 RX ORDER — CHLORDIAZEPOXIDE HYDROCHLORIDE 25 MG/1
50 CAPSULE, GELATIN COATED ORAL EVERY 6 HOURS
Status: DISCONTINUED | OUTPATIENT
Start: 2024-11-13 | End: 2024-11-14

## 2024-11-13 RX ORDER — CHLORDIAZEPOXIDE HYDROCHLORIDE 25 MG/1
25 CAPSULE, GELATIN COATED ORAL EVERY 6 HOURS
Status: DISCONTINUED | OUTPATIENT
Start: 2024-11-13 | End: 2024-11-13

## 2024-11-13 RX ADMIN — CHLORDIAZEPOXIDE HYDROCHLORIDE 50 MG: 25 CAPSULE ORAL at 19:41

## 2024-11-13 RX ADMIN — LORAZEPAM 1 MG: 1 TABLET ORAL at 06:01

## 2024-11-13 RX ADMIN — LACOSAMIDE 100 MG: 10 INJECTION INTRAVENOUS at 07:49

## 2024-11-13 RX ADMIN — CHLORDIAZEPOXIDE HYDROCHLORIDE 25 MG: 25 CAPSULE ORAL at 07:48

## 2024-11-13 RX ADMIN — Medication 10 ML: at 19:42

## 2024-11-13 RX ADMIN — LACOSAMIDE 100 MG: 10 INJECTION INTRAVENOUS at 19:41

## 2024-11-13 RX ADMIN — PREGABALIN 75 MG: 25 CAPSULE ORAL at 19:41

## 2024-11-13 RX ADMIN — LORAZEPAM 2 MG: 2 TABLET ORAL at 07:49

## 2024-11-13 RX ADMIN — Medication 1 TABLET: at 07:49

## 2024-11-13 RX ADMIN — ENOXAPARIN SODIUM 40 MG: 100 INJECTION SUBCUTANEOUS at 07:48

## 2024-11-13 RX ADMIN — CHLORDIAZEPOXIDE HYDROCHLORIDE 50 MG: 25 CAPSULE ORAL at 02:56

## 2024-11-13 RX ADMIN — Medication 100 MG: at 07:49

## 2024-11-13 RX ADMIN — CHLORDIAZEPOXIDE HYDROCHLORIDE 50 MG: 25 CAPSULE ORAL at 14:17

## 2024-11-13 RX ADMIN — LORAZEPAM 2 MG: 2 TABLET ORAL at 17:16

## 2024-11-13 RX ADMIN — LORAZEPAM 3 MG: 1 TABLET ORAL at 10:16

## 2024-11-13 RX ADMIN — PREGABALIN 75 MG: 25 CAPSULE ORAL at 07:49

## 2024-11-13 RX ADMIN — LORAZEPAM 3 MG: 1 TABLET ORAL at 19:40

## 2024-11-13 RX ADMIN — Medication 10 ML: at 07:50

## 2024-11-13 RX ADMIN — MAGNESIUM SULFATE HEPTAHYDRATE 2000 MG: 40 INJECTION, SOLUTION INTRAVENOUS at 06:49

## 2024-11-13 RX ADMIN — FOLIC ACID 1 MG: 1 TABLET ORAL at 07:49

## 2024-11-13 RX ADMIN — LORAZEPAM 2 MG: 2 TABLET ORAL at 03:58

## 2024-11-13 NOTE — PROGRESS NOTES
Reassessment completed (see Flowsheet ) Pt restless. assessed CWIA 12 gave ativan per PRN orders.  All ICU lines and monitoring  in place .

## 2024-11-13 NOTE — PROGRESS NOTES
Reassessment completed, see flowsheets. VSS. Pt more cooperative and calm. Pt up x2 to chair with chair alarm in place and turned on. Parents remain a bedside.     All ICU Lines and monitoring remain in place. Bed locked in lowest position. Call light within reach.

## 2024-11-13 NOTE — PROGRESS NOTES
Shift assessment, completed, see flow sheet. Pt is alert and oriented x 3. Following commands.     CIWA 15 currently, gave PRN ativan PO per order, see MAR. Having hallucinations, restless, anxiety, tremors.      SR 91, /95 (105), SpO2 96%. Respirations are easy, even, and unlabored. Bilateral lung sounds clear. 2 PIVs, WNL with 2g magnesium replacement infusing.     Call light within reach. Bed in lowest position. Bed alarm on.

## 2024-11-13 NOTE — CARE COORDINATION
[facility] - Acute Rehab Unit   After review, this patient is felt to be:       []  Appropriate for Acute Inpatient Rehab    []  Appropriate for Acute Inpatient Rehab Pending Insurance Authorization    []  Not appropriate for Acute Inpatient Rehab    [x]  Referral received and ARU reviewing patient; Evaluation ongoing.      Will follow for therapy and medical progress.   Will notify DCP with further updates. Thank you for the referral.  Adrianne Walker RN

## 2024-11-13 NOTE — PROGRESS NOTES
Care rounds completed with Dr. Garcia and multidisciplinary team. Reviewed labs, meds, VS, assessment, & plan of care for today. See dictated note and new orders for details.     Okay to move out of ICU  Continue JOSE LWA

## 2024-11-13 NOTE — CARE COORDINATION
Spoke with OT/PT and they feel the patient will need physical rehab. Patient having a difficult time walking. They stated they will recommend an ARU.    Spoke with Adrianne/[facility] to ask if they can look at patient to see if he is a candidate for an ARU. Adrianne will review the patient but advised she has seen patients in the past that present in this same way but in one or two days they are walking and do not need therapy.    SADE spoke with Sharan/[facility]. Sharan reports he spoke with the patient's mother earlier today. [facility] has not been completely ruled out but the family is leaning toward something 45 minutes from their home. Sharan is interested in helping the patient and the family even [facility].     Sharan plans to reach out to the patient's mother again tomorrow.

## 2024-11-13 NOTE — PROGRESS NOTES
Inpatient Physical Therapy Treatment    Unit: ICU  Date:  11/13/2024  Patient Name:    Fernando Abdullahi  Admitting diagnosis:  Seizure (HCC) [R56.9]  Alcohol withdrawal syndrome with complication (HCC) [F10.939]  Admit Date:  11/7/2024  Precautions/Restrictions/WB Status/ Lines/ Wounds/ Oxygen: Fall risk, Bed/chair alarm, Lines (IV and external catheter), Telemetry, and Telesitter        Pt seen for cotreatment this date due to patient safety, patient endurance, and limited functional status information    Treatment Time:  7092-2175  Treatment Number:  2   Timed Code Treatment Minutes: 60 minutes  Total Treatment Minutes:  60  minutes    Patient Stated Goals for Therapy: none stated          Discharge Recommendations: ARU/IRF (inpatient rehab facility)   DME needs for discharge: Defer to facility       Therapy recommendation for EMS Transport: requires transport by cot due to pt needs A x 2 for safe transfers and pt with poor sitting balance/tolerance    Therapy recommendations for staff:   Assist of 2 for ambulation with use of rolling walker (RW) and gait belt to/from chair  to/from bathroom  within room  within halls    History of Present Illness:   Per Dr. Whitley H&P 11/8/24:  \"29-year-old male with a history of alcohol abuse presented to the emergency room due to an observed seizure at home.  He had 2 more seizures while in the emergency room.  Given IV Ativan.  Admitted to the ICU.  Has not had any further seizures\"     Preadmission Environment:   Pt. Lives                                              alone  Home environment:                            apartment , ground floor   Steps to enter first floor:                     No steps  Steps to second floor/basement:        N/A  Laundry:                                              1st floor  Bathroom:                                           tub/shower unit, grab bars in shower, and standard height toilet  Pt sleeps in a:

## 2024-11-13 NOTE — FLOWSHEET NOTE
Pt trying to get out of bed. States he is \"signing myself out. Not going to rehab\". Able to convince pt to get back in bed and take things hourly and day by day.        11/13/24 1016   CIWA-Ar   Nausea and Vomiting 0   Tactile Disturbances 3   Tremor 3   Auditory Disturbances 0   Paroxysmal Sweats 3   Visual Disturbances 2   Anxiety 3   Headache, Fullness in Head 0   Agitation 5   Orientation and Clouding of Sensorium 1   CIWA-Ar Total (!) 20     Gave 3mg PO PRN per order.

## 2024-11-13 NOTE — PROGRESS NOTES
Pulmonary & Critical Care Medicine ICU Progress Note    CC: ETOH w/d and seizure     Events of Last 24 hours: Precedex weaned off yesterday.  Patient did have some restlessness trying to get out of bed and having hallucinations and was requiring frequent Ativan.      Vitals:  BP (!) 132/95   Pulse 84   Temp 98.9 °F (37.2 °C) (Oral)   Resp 18   Ht 1.854 m (6' 1\")   Wt 67.4 kg (148 lb 9.6 oz)   SpO2 100%   BMI 19.61 kg/m²    Constitutional:  No acute distress.   Eyes: PERRL. Conjunctivae anicteric.   ENT: Normal nose. Normal tongue.    Neck:  Trachea is midline.   Respiratory: No accessory muscle usage.  Decreased breath sounds. No wheezes. No rales. No Rhonchi.  Cardiovascular: Normal S1S2. No digit clubbing. No digit cyanosis. No LE edema.   Psychiatric: No anxiety or Agitation. Alert and Oriented to person, place and time.     Scheduled Meds:   chlordiazePOXIDE  25 mg Oral Q6H    enoxaparin  40 mg SubCUTAneous Daily    nicotine  1 patch TransDERmal Daily    lacosamide  100 mg IntraVENous BID    sodium chloride flush  5-40 mL IntraVENous 2 times per day    thiamine  100 mg Oral Daily    folic acid  1 mg Oral Daily    therapeutic multivitamin-minerals  1 tablet Oral Daily    pregabalin  75 mg Oral BID     PRN Meds:  sodium chloride flush, sodium chloride, potassium chloride **OR** potassium chloride, magnesium sulfate, ondansetron **OR** ondansetron, polyethylene glycol, acetaminophen **OR** acetaminophen, LORazepam **OR** LORazepam **OR** LORazepam **OR** LORazepam **OR** LORazepam **OR** LORazepam **OR** LORazepam **OR** LORazepam, morphine    Results:  CBC:   Recent Labs     11/11/24  0423 11/12/24  0740 11/13/24  0431   WBC 8.0 6.4 4.9   HGB 13.5 13.8 12.2*   HCT 39.3* 40.0* 35.9*   MCV 90.0 91.3 89.3   * 136 189     BMP:   Recent Labs     11/12/24  0740 11/13/24  0431   * 134*   K 3.2* 3.4*   CL 96* 99   CO2 25 24   BUN 6* 6*   CREATININE 0.5* 0.5*     LIVER PROFILE:   No results for

## 2024-11-13 NOTE — PROGRESS NOTES
Reassessment completed (see Flowsheet ).  All ICU lines and monitoring  in place. Bed alarm  and call light in place

## 2024-11-13 NOTE — FLOWSHEET NOTE
11/13/24 1716   CIWA-Ar   Nausea and Vomiting 0   Tactile Disturbances 3   Tremor 3   Auditory Disturbances 0   Paroxysmal Sweats 2   Visual Disturbances 3   Anxiety 0   Headache, Fullness in Head 0   Agitation 3   Orientation and Clouding of Sensorium 0   CIWA-Ar Total (!) 14       Gave 2 mg PO ativan per order, see MAR.

## 2024-11-13 NOTE — PLAN OF CARE
Problem: Discharge Planning  Goal: Discharge to home or other facility with appropriate resources  Outcome: Progressing     Problem: Safety - Adult  Goal: Free from fall injury  Outcome: Progressing     Problem: Safety - Medical Restraint  Goal: Remains free of injury from restraints (Restraint for Interference with Medical Device)  Description: INTERVENTIONS:  1. Determine that other, less restrictive measures have been tried or would not be effective before applying the restraint  2. Evaluate the patient's condition at the time of restraint application  3. Inform patient/family regarding the reason for restraint  4. Q2H: Monitor safety, psychosocial status, comfort, nutrition and hydration  Outcome: Progressing  Flowsheets  Taken 11/12/2024 1400 by Bambi Mendiola RN  Remains free of injury from restraints (restraint for interference with medical device): Every 2 hours: Monitor safety, psychosocial status, comfort, nutrition and hydration  Taken 11/12/2024 1200 by Bambi Mendiola RN  Remains free of injury from restraints (restraint for interference with medical device): Every 2 hours: Monitor safety, psychosocial status, comfort, nutrition and hydration  Taken 11/12/2024 0800 by Bambi Mendiola RN  Remains free of injury from restraints (restraint for interference with medical device): Every 2 hours: Monitor safety, psychosocial status, comfort, nutrition and hydration     Problem: Confusion  Goal: Confusion, delirium, dementia, or psychosis is improved or at baseline  Description: INTERVENTIONS:  1. Assess for possible contributors to thought disturbance, including medications, impaired vision or hearing, underlying metabolic abnormalities, dehydration, psychiatric diagnoses, and notify attending LIP  2. [place] high risk fall precautions, as indicated  3. Provide frequent short contacts to provide reality reorientation, refocusing and direction  4. Decrease environmental stimuli, including noise as

## 2024-11-13 NOTE — PROGRESS NOTES
Reassessment completed, see flowsheets. VSS. Pt in on and off restless, refusing ativan at this time but did take his librium. Pt up x2 to BSC, continues to be unsteady and impulsive. Parents remain a bedside.      All ICU Lines and monitoring remain in place.  Bed locked in lowest position. Bed alarm turned on. Call light within reach. Telesitter in place for pt safety. .

## 2024-11-13 NOTE — PROGRESS NOTES
Shift assessment completed, pt alert, follows commands , needs reinforcement R/T safety and using call light for needs. Bed alarm on

## 2024-11-13 NOTE — PROGRESS NOTES
Pulmonary & Critical Care Medicine ICU Progress Note    CC: ETOH w/d and seizure     Events of Last 24 hours: Precedex weaned off yesterday.  Patient did have some restlessness trying to get out of bed and having hallucinations and was requiring frequent Ativan.      Vitals:  BP (!) 127/96   Pulse (!) 102   Temp 98.1 °F (36.7 °C) (Axillary)   Resp 20   Ht 1.854 m (6' 1\")   Wt 67.4 kg (148 lb 9.6 oz)   SpO2 100%   BMI 19.61 kg/m²    Constitutional:  No acute distress.   Eyes: PERRL. Conjunctivae anicteric.   ENT: Normal nose. Normal tongue.    Neck:  Trachea is midline.   Respiratory: No accessory muscle usage.  Decreased breath sounds. No wheezes. No rales. No Rhonchi.  Cardiovascular: Normal S1S2. No digit clubbing. No digit cyanosis. No LE edema.   Psychiatric: No anxiety or Agitation. Alert and Oriented to person, place and time.     Scheduled Meds:   chlordiazePOXIDE  50 mg Oral Q6H    enoxaparin  40 mg SubCUTAneous Daily    nicotine  1 patch TransDERmal Daily    lacosamide  100 mg IntraVENous BID    sodium chloride flush  5-40 mL IntraVENous 2 times per day    thiamine  100 mg Oral Daily    folic acid  1 mg Oral Daily    therapeutic multivitamin-minerals  1 tablet Oral Daily    pregabalin  75 mg Oral BID     PRN Meds:  sodium chloride flush, sodium chloride, potassium chloride **OR** potassium chloride, magnesium sulfate, ondansetron **OR** ondansetron, polyethylene glycol, acetaminophen **OR** acetaminophen, LORazepam **OR** LORazepam **OR** LORazepam **OR** LORazepam **OR** LORazepam **OR** LORazepam **OR** LORazepam **OR** LORazepam, morphine    Results:  CBC:   Recent Labs     11/11/24  0423 11/12/24  0740 11/13/24  0431   WBC 8.0 6.4 4.9   HGB 13.5 13.8 12.2*   HCT 39.3* 40.0* 35.9*   MCV 90.0 91.3 89.3   * 136 189     BMP:   Recent Labs     11/12/24  0740 11/13/24  0431 11/13/24  0829   * 134*  --    K 3.2* 3.4* 3.6   CL 96* 99  --    CO2 25 24  --    BUN 6* 6*  --    CREATININE 0.5*

## 2024-11-13 NOTE — PROGRESS NOTES
demonstrating intermittent knee buckling, decreased coordination, assist required with walker management, repeated cues for directional awareness and motor planning    ADLs:  Dressing:      UE:   Not Tested  LE:    Not Tested    Bathing:    UE:  Not Tested  LE:  Not Tested    Eating:   Supervision to eat from pudding cup; pt with intermittent difficulty with fine motor coordination, at times undershooting or overshooting target    Toileting:  Not Tested    Grooming/hygiene: Supervision to wipe face with washcloth, wipe nose with tissue while seated    Ther Ex / Activities Initiated:   N/A    Positioning Needs:   Pt in bed and ICU monitoring  Call light provided and all needs within reach    Patient/Family Education:   Pt educated on role of inpatient OT, plan of care, importance of continued activity, DC recommendations, functional transfer/mobility safety, transfer techniques, and calling for assist with mobility    CHF Education  N/A    Assessment:  Pt seen for occupational therapy followup session in the acute care setting.  Pt limited by decreased balance, endurance, and coordination this date, as well as increased impulsivity. Making gradual progress toward goals. Pt demonstrating decreased balance and endurance which impacts safety and independence of completion of standing and seated level ADLs.  He displays posterior lean in both seated and standing positions, at times requiring therapist assist or verbal cues to correct.  Currently requires at least unilateral UE support on surface to assist with maintaining standing balance.  During completion of dynamic standing balance task, focusing on reach/grasp outside base of support, pt displayed difficulty accurately reaching and obtaining object with either hand.  At seated level, pt is able to attempt to complete bimanual tasks.  Pt tolerated extended therapy session this date and is motivated to regain independent PLOF.  Pt functioning below baseline and will

## 2024-11-13 NOTE — PROGRESS NOTES
Dr. Garcia and Dr. Bales updated on pt increasing need of ativan.   Dr. Garcia increased librium back up to 50 mg

## 2024-11-14 LAB
ANION GAP SERPL CALCULATED.3IONS-SCNC: 12 MMOL/L (ref 3–16)
BASOPHILS # BLD: 0.1 K/UL (ref 0–0.2)
BASOPHILS NFR BLD: 2.5 %
BUN SERPL-MCNC: 7 MG/DL (ref 7–20)
CALCIUM SERPL-MCNC: 8.5 MG/DL (ref 8.3–10.6)
CHLORIDE SERPL-SCNC: 98 MMOL/L (ref 99–110)
CO2 SERPL-SCNC: 23 MMOL/L (ref 21–32)
CREAT SERPL-MCNC: 0.5 MG/DL (ref 0.9–1.3)
DEPRECATED RDW RBC AUTO: 14.3 % (ref 12.4–15.4)
EOSINOPHIL # BLD: 0.1 K/UL (ref 0–0.6)
EOSINOPHIL NFR BLD: 1.6 %
GFR SERPLBLD CREATININE-BSD FMLA CKD-EPI: >90 ML/MIN/{1.73_M2}
GLUCOSE SERPL-MCNC: 98 MG/DL (ref 70–99)
HCT VFR BLD AUTO: 36 % (ref 40.5–52.5)
HGB BLD-MCNC: 12.6 G/DL (ref 13.5–17.5)
LYMPHOCYTES # BLD: 0.6 K/UL (ref 1–5.1)
LYMPHOCYTES NFR BLD: 19.6 %
MCH RBC QN AUTO: 31.7 PG (ref 26–34)
MCHC RBC AUTO-ENTMCNC: 35 G/DL (ref 31–36)
MCV RBC AUTO: 90.6 FL (ref 80–100)
MONOCYTES # BLD: 0.7 K/UL (ref 0–1.3)
MONOCYTES NFR BLD: 20.4 %
NEUTROPHILS # BLD: 1.9 K/UL (ref 1.7–7.7)
NEUTROPHILS NFR BLD: 55.9 %
PLATELET # BLD AUTO: 228 K/UL (ref 135–450)
PMV BLD AUTO: 8.8 FL (ref 5–10.5)
POTASSIUM SERPL-SCNC: 3.6 MMOL/L (ref 3.5–5.1)
RBC # BLD AUTO: 3.97 M/UL (ref 4.2–5.9)
SODIUM SERPL-SCNC: 133 MMOL/L (ref 136–145)
WBC # BLD AUTO: 3.3 K/UL (ref 4–11)

## 2024-11-14 PROCEDURE — 1200000000 HC SEMI PRIVATE

## 2024-11-14 PROCEDURE — 92523 SPEECH SOUND LANG COMPREHEN: CPT

## 2024-11-14 PROCEDURE — 2580000003 HC RX 258: Performed by: FAMILY MEDICINE

## 2024-11-14 PROCEDURE — 6370000000 HC RX 637 (ALT 250 FOR IP): Performed by: INTERNAL MEDICINE

## 2024-11-14 PROCEDURE — 99233 SBSQ HOSP IP/OBS HIGH 50: CPT | Performed by: INTERNAL MEDICINE

## 2024-11-14 PROCEDURE — 36415 COLL VENOUS BLD VENIPUNCTURE: CPT

## 2024-11-14 PROCEDURE — 6370000000 HC RX 637 (ALT 250 FOR IP): Performed by: PSYCHIATRY & NEUROLOGY

## 2024-11-14 PROCEDURE — 85025 COMPLETE CBC W/AUTO DIFF WBC: CPT

## 2024-11-14 PROCEDURE — 6370000000 HC RX 637 (ALT 250 FOR IP): Performed by: FAMILY MEDICINE

## 2024-11-14 PROCEDURE — 6360000002 HC RX W HCPCS: Performed by: INTERNAL MEDICINE

## 2024-11-14 PROCEDURE — 99232 SBSQ HOSP IP/OBS MODERATE 35: CPT | Performed by: INTERNAL MEDICINE

## 2024-11-14 PROCEDURE — 80048 BASIC METABOLIC PNL TOTAL CA: CPT

## 2024-11-14 RX ORDER — CHLORDIAZEPOXIDE HYDROCHLORIDE 25 MG/1
25 CAPSULE, GELATIN COATED ORAL EVERY 6 HOURS
Status: DISCONTINUED | OUTPATIENT
Start: 2024-11-14 | End: 2024-11-15 | Stop reason: HOSPADM

## 2024-11-14 RX ADMIN — CHLORDIAZEPOXIDE HYDROCHLORIDE 25 MG: 25 CAPSULE ORAL at 15:26

## 2024-11-14 RX ADMIN — CHLORDIAZEPOXIDE HYDROCHLORIDE 50 MG: 25 CAPSULE ORAL at 09:25

## 2024-11-14 RX ADMIN — PREGABALIN 75 MG: 25 CAPSULE ORAL at 09:25

## 2024-11-14 RX ADMIN — Medication 100 MG: at 09:25

## 2024-11-14 RX ADMIN — Medication 10 ML: at 09:26

## 2024-11-14 RX ADMIN — Medication 10 ML: at 20:06

## 2024-11-14 RX ADMIN — CHLORDIAZEPOXIDE HYDROCHLORIDE 25 MG: 25 CAPSULE ORAL at 20:05

## 2024-11-14 RX ADMIN — Medication 1 TABLET: at 09:25

## 2024-11-14 RX ADMIN — PREGABALIN 75 MG: 25 CAPSULE ORAL at 20:05

## 2024-11-14 RX ADMIN — LORAZEPAM 1 MG: 1 TABLET ORAL at 05:35

## 2024-11-14 RX ADMIN — ENOXAPARIN SODIUM 40 MG: 100 INJECTION SUBCUTANEOUS at 09:25

## 2024-11-14 RX ADMIN — FOLIC ACID 1 MG: 1 TABLET ORAL at 09:25

## 2024-11-14 NOTE — PROGRESS NOTES
Care rounds completed with Dr. Garcia and multidisciplinary team. Reviewed labs, meds, VS, assessment, & plan of care for today. See dictated note and new orders for details.     DC precedex  Okay to move out ICU

## 2024-11-14 NOTE — PLAN OF CARE
Problem: Safety - Adult  Goal: Free from fall injury  Outcome: Progressing     Problem: Safety - Medical Restraint  Goal: Remains free of injury from restraints (Restraint for Interference with Medical Device)  Description: INTERVENTIONS:  1. Determine that other, less restrictive measures have been tried or would not be effective before applying the restraint  2. Evaluate the patient's condition at the time of restraint application  3. Inform patient/family regarding the reason for restraint  4. Q2H: Monitor safety, psychosocial status, comfort, nutrition and hydration  Outcome: Progressing     Problem: Confusion  Goal: Confusion, delirium, dementia, or psychosis is improved or at baseline  Description: INTERVENTIONS:  1. Assess for possible contributors to thought disturbance, including medications, impaired vision or hearing, underlying metabolic abnormalities, dehydration, psychiatric diagnoses, and notify attending LIP  2. [place] high risk fall precautions, as indicated  3. Provide frequent short contacts to provide reality reorientation, refocusing and direction  4. Decrease environmental stimuli, including noise as appropriate  5. Monitor and intervene to maintain adequate nutrition, hydration, elimination, sleep and activity  6. If unable to ensure safety without constant attention obtain sitter and review sitter guidelines with assigned personnel  7. Initiate Psychosocial CNS and Spiritual Care consult, as indicated  Outcome: Progressing

## 2024-11-14 NOTE — PROGRESS NOTES
Internal Medicine ICU Progress Note      Events of Last 24 hours:     Admitted for seizures   Patient was agitated and on Precedex.  EEG showed no seizure activity.  Parents at bedside.    11/10-became agitated yesterday.  Received additional Ativan.  Had 1 episode of extreme agitation and violent behavior and security was called into the room.      - Mr Jackson seen up in bed, awake, alert and better oriented today   Reports he is doing much better although still tremulous  Out of restraints and off precedex    Calm and cooperative    No more seziures      Doing better today.  No more seizures.      Invasive Lines: PIV        MV:  n/a    No results for input(s): \"PHART\", \"JUS0HPA\", \"PO2ART\" in the last 72 hours.      IV:   sodium chloride         Vitals:  Temp  Av.4 °F (36.9 °C)  Min: 97.8 °F (36.6 °C)  Max: 98.7 °F (37.1 °C)  Pulse  Av.8  Min: 79  Max: 105  BP  Min: 114/73  Max: 138/99  SpO2  Av %  Min: 98 %  Max: 100 %  Patient Vitals for the past 4 hrs:   BP Pulse Resp SpO2   24 1000 -- 85 18 --   24 0916 133/89 84 13 98 %   24 0900 -- 85 15 --       CVP:        Intake/Output Summary (Last 24 hours) at 2024 1205  Last data filed at 2024 0916  Gross per 24 hour   Intake 300 ml   Output 450 ml   Net -150 ml       EXAM:        General:  young male  awake, alert and better oriented    . Appears to be not in any distress  Mucous Membranes:  Pink , anicteric  Neck: No JVD, no carotid bruit, no thyromegaly  Chest:  Clear to auscultation bilaterally, no added sounds  Cardiovascular:  RRR S1S2 heard, no murmurs or gallops  Abdomen:  Soft, undistended, non tender, no organomegaly, BS present  Extremities: No edema or cyanosis. Distal pulses well felt  Neurological : calm and cooperative at this time, remains in restraints, answering some questions appropriately   Moving all ext  Gen weakness with ext tremors       Medications:  Scheduled Meds:   chlordiazePOXIDE

## 2024-11-14 NOTE — PLAN OF CARE
SLP completed evaluation. Please refer to notes in EMR.      Thank you,   Lovely Gonzalez M.A. CCC-SLP   Speech-Language Pathologist

## 2024-11-14 NOTE — PROGRESS NOTES
Speech Language Pathology  Facility/Department: [facility] ICU  Initial Speech/Language/Cognitive Assessment       NAME:Fernando Abdullahi  : 1995 (29 y.o.)   MRN: [id number]  ROOM: [id number]  ADMISSION DATE: 2024  PATIENT DIAGNOSIS(ES): Seizure (HCC) [R56.9]  Alcohol withdrawal syndrome with complication (HCC) [F10.939]  Patient Active Problem List    Diagnosis Date Noted    Alcohol withdrawal syndrome without complication (HCC) 2024    Thrombocytopenia (HCC) 2024    Elevated LFTs 2024    Seizure (HCC) 2024     History reviewed. No pertinent past medical history.  History reviewed. No pertinent surgical history.  Allergies   Allergen Reactions    Naproxen Hives       Date of Evaluation: 2024   Evaluating Therapist: EWA Chauhan    Subjective:   Chart Reviewed: : [x] Yes [] No    Onset Date: 24    Recent Results of CT of Head:  Date: 24  Impressions:   No acute intracranial abnormality.     Medical record review/interview: Per MD H&P on 24: \"A 29-year-old male with a history of alcohol abuse presented to the emergency room due to an observed seizure at home.  He had 2 more seizures while in the emergency room.  Given IV Ativan.  Admitted to the ICU.  Has not had any further seizures\"    Behavior / Cognition: alert, cooperative, and pleasant mood    Primary Complaint: some disorientation per pt       Pain: The patient c/o right leg discomfort     Vitals/labs:   SpO2: 96%  RR: 16  BP: 133/89  HR: 84    Vision / Hearing:  Vision: Wears glasses all the time  Hearing: WFL    Previous Level of Function:  Living Status: Alone  Occupation: Full-time employment- vault teler at bank   Highest Level of Education: college education   Homemaking Responsibilities:   Meal Prep Responsibility: Primary  Laundry Responsibility: Primary  Cleaning Responsibility: Primary  Bill Paying / Finance Responsibility: Primary  Shopping Responsibility: Primary  Health Care Management: no    Time Frame for Long Term Goals: 7 Days (11/21/24)  Goal 1: Pt will demonstrate improved cognitive linguistic function for safe return to prior level of care.      Short Term Goals:  Time Frame for Short Term Goals: 5 Days (11/19/24)  Goal 1: Patient will complete executive function tasks (i.e. planning, functional organization tasks) related to relevant ADLs with 80% accuracy given min cues   Goal 2: Pt will complete graded problem solving and reasoning tasks on 70% opp given mod cues   Goal 3: Pt will use visual aids/ strategies to state orientation to time, place, situation with 80% acc given min cues       Objective:   Cranial nerve / Oral motor exam:   CN V (trigeminal): ophthalmic, maxillary, and mandibular facial sensation- WNL b/l  CN VII (facial): WNL b/l  CN IX/X (glossopharyngeal/vagus): MPT: DNT; pitch range: Adequate; vocal quality: Adequate; cough: Strong-perceptually  CN XII (hypoglossal): WNL b/l    Dentition: natural    Motor Speech: WFL        Comprehension:   Auditory Comprehension: [facility]      Reading Comprehension: To be assessed      Expression:   Primary Mode of Expression: Verbal  Verbal Expression: [facility]    Written Expression: To be assessed     Pragmatics/Social Functioning: [facility]      Cognition:  Overall Orientation : .Mild and Moderate   Deficits: Disoriented to time, Disoriented to location, and Disoriented to place    Attention: [facility]    Memory: [facility]    Problem Solving: To be assessed    Numeric Reasoning: [facility]    Abstract Reasoning: To be assessed    Safety/Judgement: Severe   Deficits:: Novel Situations, Insight, and Flexibility of thought    Voice:   Voice: not formally assessed, appeared [facility]     Plan  Prognosis:  Speech Therapy Prognosis  Prognosis: Good  Prognosis Considerations: Age, Motivation, Co-morbidities , Family involvement / support, Financial Resources, Medical Diagnosis, Medical Prognosis, Participation Level, Potential, Previous Level of Function , and Severity of impairments

## 2024-11-14 NOTE — PROGRESS NOTES
Blood pressure (!) 127/106, pulse (!) 104, temperature 97.8 °F (36.6 °C), temperature source Oral, resp. rate 17, height 1.854 m (6' 1\"), weight 67.4 kg (148 lb 9.6 oz), SpO2 100%.    Patient trying to get up out of bed. Redirected patient many times. CIWA scale = 20 PRN ativan given per order.     Shift assessment complete. See doc flow. Nightly medications given see MAR. Patient with no complaints at this time. Call light and bedside table within easy reach.

## 2024-11-14 NOTE — PROGRESS NOTES
Pulmonary & Critical Care Medicine ICU Progress Note    CC: ETOH w/d and seizure     Events of Last 24 hours: remains off of precedex      Vitals:  BP (!) 135/90   Pulse 80   Temp 98.6 °F (37 °C) (Tympanic)   Resp 15   Ht 1.854 m (6' 1\")   Wt 66.9 kg (147 lb 8 oz)   SpO2 100%   BMI 19.46 kg/m²    Constitutional:  No acute distress.   Eyes: PERRL. Conjunctivae anicteric.   ENT: Normal nose. Normal tongue.    Neck:  Trachea is midline.   Respiratory: No accessory muscle usage.  Decreased breath sounds. No wheezes. No rales. No Rhonchi.  Cardiovascular: Normal S1S2. No digit clubbing. No digit cyanosis. No LE edema.   Psychiatric: No anxiety or Agitation. Alert and Oriented to person, place and time.     Scheduled Meds:   chlordiazePOXIDE  50 mg Oral Q6H    enoxaparin  40 mg SubCUTAneous Daily    nicotine  1 patch TransDERmal Daily    sodium chloride flush  5-40 mL IntraVENous 2 times per day    thiamine  100 mg Oral Daily    folic acid  1 mg Oral Daily    therapeutic multivitamin-minerals  1 tablet Oral Daily    pregabalin  75 mg Oral BID     PRN Meds:  sodium chloride flush, sodium chloride, potassium chloride **OR** potassium chloride, magnesium sulfate, ondansetron **OR** ondansetron, polyethylene glycol, acetaminophen **OR** acetaminophen, LORazepam **OR** LORazepam **OR** LORazepam **OR** LORazepam **OR** LORazepam **OR** LORazepam **OR** LORazepam **OR** LORazepam, morphine    Results:  CBC:   Recent Labs     11/12/24  0740 11/13/24 0431 11/14/24 0438   WBC 6.4 4.9 3.3*   HGB 13.8 12.2* 12.6*   HCT 40.0* 35.9* 36.0*   MCV 91.3 89.3 90.6    189 228     BMP:   Recent Labs     11/12/24  0740 11/13/24 0431 11/13/24  0829 11/14/24 0438   * 134*  --  133*   K 3.2* 3.4* 3.6 3.6   CL 96* 99  --  98*   CO2 25 24  --  23   BUN 6* 6*  --  7   CREATININE 0.5* 0.5*  --  0.5*     LIVER PROFILE:   No results for input(s): \"AST\", \"ALT\", \"LIPASE\", \"AMYLASE\", \"BILIDIR\", \"BILITOT\", \"ALKPHOS\" in the last

## 2024-11-14 NOTE — PROGRESS NOTES
Discussed with neurology if there is a need for vimpat and if so switching to PO. Stated pt will only discharge with pregabalin, and to DC vimpat

## 2024-11-14 NOTE — PROGRESS NOTES
Reassessment completed, see flowsheets, unchanged from prior. VSS. Pt sleeping on and off AM. Not requiring ativan per CIWA. Family at bedside.     All ICU Lines and monitoring remain in place. Bed locked in lowest position. Call light within reach. Telesitter in place for pt safety

## 2024-11-14 NOTE — PROGRESS NOTES
11/14/24 1411   Encounter Summary   Encounter Overview/Reason Spiritual/Emotional Needs   Service Provided For Patient   Last Encounter    (11/14: Fernando speaking with representative from [facility] at time, Marialuisa Garza, appreciated me checking in, informed him of our availability for listening and support.)   Begin Time 1405   End Time  1411   Total Time Calculated 6 min   Spiritual/Emotional needs   Type Spiritual Support     Thank you for consulting Spiritual Health    If you would like a 's presence for emotional, spiritual, grief or comfort care,   please dial \"0\" and ask for the  on-call to be paged.    For help with Advanced Care Planning, Power of  for Healthcare or Living Will forms, you may also call us directly:    [phone]) [place]  [phone]) [place]  [phone]) Outpatient    [facility]

## 2024-11-14 NOTE — CARE COORDINATION
Received notification from  Amanda at [facility] that MD is denying ARU, said patient's needs could be met at a skilled facility. Peer to Peer option (no deadline ) can be scheduled by calling [phone] with Dr. Barreto  Updated CM. Adrianne Walker RN

## 2024-11-14 NOTE — CARE COORDINATION
Received voice mail from Adrianne/PARISH. Adrianne is stopping by to speak with the patient and family this morning. Adrianne has information she would like to present to the family.    SADE spoke with the patient's mother and father. They are open to [facility] and feel it is a good location for him as his Aunt works at [employer].     9:27 - Adrianne/PARISH is here to meet with the family.    Family and patient agreeable top AAR.    Adrianne submitted precert today. Insurance is requesting a P2P.    SADE spoke with Dr. ESTRADA and he is unsure he can say anything that will change \"their minds\". CM will get some documentation from [facility] to assist with P2P. Dr. ESTRADA is in agreement to do the P2P.     Documentation for P2P given to Coco STUART to give to Dr. Whitley in the morning. Bambi ARCHULETA also aware and will pass on in report.     SADE updated the family on the plan for a P2P in the morning.

## 2024-11-14 NOTE — PROGRESS NOTES
Shift assessment, completed, see flow sheet. Pt is alert and oriented x 3. Following commands.      CIWA 7 currently, gave PRN ativan PO per order, see MAR.      SR 95, BP  133/89 (101), SpO2 98 %. Respirations are easy, even, and unlabored. Bilateral lung sounds clear. 2 PIVs, WNL, saline locked.     Call light within reach. Bed in lowest position. Bed alarm on.

## 2024-11-14 NOTE — CARE COORDINATION
[facility] - Acute Rehab Unit   After review, this patient is felt to be:       []  Appropriate for Acute Inpatient Rehab    [x]  Appropriate for Acute Inpatient Rehab Pending Insurance Authorization    []  Not appropriate for Acute Inpatient Rehab    []  Referral received and ARU reviewing patient; Evaluation ongoing.      Precert initiated 11/14/24 for ARU admission. Pt in agreement per conversation with pt and family this AM. Ref#: [id number].  Will notify DCP with further updates. Thank you for the referral.  Adrianne Walker RN

## 2024-11-15 ENCOUNTER — HOSPITAL ENCOUNTER (INPATIENT)
Age: 29
LOS: 8 days | Discharge: HOME | DRG: 101 | End: 2024-11-23
Attending: STUDENT IN AN ORGANIZED HEALTH CARE EDUCATION/TRAINING PROGRAM | Admitting: STUDENT IN AN ORGANIZED HEALTH CARE EDUCATION/TRAINING PROGRAM
Payer: COMMERCIAL

## 2024-11-15 VITALS
WEIGHT: 149.2 LBS | BODY MASS INDEX: 19.78 KG/M2 | HEIGHT: 73 IN | HEART RATE: 93 BPM | RESPIRATION RATE: 23 BRPM | DIASTOLIC BLOOD PRESSURE: 87 MMHG | OXYGEN SATURATION: 98 % | TEMPERATURE: 98.9 F | SYSTOLIC BLOOD PRESSURE: 127 MMHG

## 2024-11-15 DIAGNOSIS — R56.9 SEIZURE (HCC): ICD-10-CM

## 2024-11-15 DIAGNOSIS — F10.939 ALCOHOL WITHDRAWAL SYNDROME WITH COMPLICATION (HCC): Primary | ICD-10-CM

## 2024-11-15 PROCEDURE — 97530 THERAPEUTIC ACTIVITIES: CPT

## 2024-11-15 PROCEDURE — 97535 SELF CARE MNGMENT TRAINING: CPT

## 2024-11-15 PROCEDURE — 97112 NEUROMUSCULAR REEDUCATION: CPT

## 2024-11-15 PROCEDURE — 99239 HOSP IP/OBS DSCHRG MGMT >30: CPT | Performed by: INTERNAL MEDICINE

## 2024-11-15 PROCEDURE — 1280000000 HC REHAB R&B

## 2024-11-15 PROCEDURE — 6370000000 HC RX 637 (ALT 250 FOR IP): Performed by: INTERNAL MEDICINE

## 2024-11-15 PROCEDURE — 6360000002 HC RX W HCPCS: Performed by: INTERNAL MEDICINE

## 2024-11-15 PROCEDURE — 6370000000 HC RX 637 (ALT 250 FOR IP): Performed by: STUDENT IN AN ORGANIZED HEALTH CARE EDUCATION/TRAINING PROGRAM

## 2024-11-15 RX ORDER — FOLIC ACID 1 MG/1
1 TABLET ORAL DAILY
Status: DISCONTINUED | OUTPATIENT
Start: 2024-11-16 | End: 2024-11-23 | Stop reason: HOSPADM

## 2024-11-15 RX ORDER — CHLORDIAZEPOXIDE HYDROCHLORIDE 25 MG/1
25 CAPSULE, GELATIN COATED ORAL EVERY 6 HOURS
Qty: 8 CAPSULE | Refills: 0 | Status: ON HOLD
Start: 2024-11-15 | End: 2024-11-15

## 2024-11-15 RX ORDER — LORAZEPAM 2 MG/ML
2 INJECTION INTRAMUSCULAR
Status: CANCELLED | OUTPATIENT
Start: 2024-11-15

## 2024-11-15 RX ORDER — ENOXAPARIN SODIUM 100 MG/ML
40 INJECTION SUBCUTANEOUS DAILY
Status: CANCELLED | OUTPATIENT
Start: 2024-11-16

## 2024-11-15 RX ORDER — LORAZEPAM 2 MG/ML
3 INJECTION INTRAMUSCULAR
Status: DISCONTINUED | OUTPATIENT
Start: 2024-11-15 | End: 2024-11-18

## 2024-11-15 RX ORDER — PREGABALIN 75 MG/1
75 CAPSULE ORAL 2 TIMES DAILY
Qty: 60 CAPSULE | Refills: 0 | Status: ON HOLD
Start: 2024-11-15 | End: 2024-11-15

## 2024-11-15 RX ORDER — LORAZEPAM 2 MG/ML
1 INJECTION INTRAMUSCULAR
Status: CANCELLED | OUTPATIENT
Start: 2024-11-15

## 2024-11-15 RX ORDER — LORAZEPAM 2 MG/ML
3 INJECTION INTRAMUSCULAR
Status: CANCELLED | OUTPATIENT
Start: 2024-11-15

## 2024-11-15 RX ORDER — LORAZEPAM 1 MG/1
1 TABLET ORAL
Status: CANCELLED | OUTPATIENT
Start: 2024-11-15

## 2024-11-15 RX ORDER — NICOTINE 21 MG/24HR
1 PATCH, TRANSDERMAL 24 HOURS TRANSDERMAL DAILY
Status: DISCONTINUED | OUTPATIENT
Start: 2024-11-16 | End: 2024-11-23 | Stop reason: HOSPADM

## 2024-11-15 RX ORDER — LORAZEPAM 1 MG/1
4 TABLET ORAL
Status: DISCONTINUED | OUTPATIENT
Start: 2024-11-15 | End: 2024-11-18

## 2024-11-15 RX ORDER — LORAZEPAM 2 MG/1
4 TABLET ORAL
Status: CANCELLED | OUTPATIENT
Start: 2024-11-15

## 2024-11-15 RX ORDER — LORAZEPAM 2 MG/ML
2 INJECTION INTRAMUSCULAR
Status: DISCONTINUED | OUTPATIENT
Start: 2024-11-15 | End: 2024-11-18

## 2024-11-15 RX ORDER — LORAZEPAM 2 MG/ML
4 INJECTION INTRAMUSCULAR
Status: CANCELLED | OUTPATIENT
Start: 2024-11-15

## 2024-11-15 RX ORDER — M-VIT,TX,IRON,MINS/CALC/FOLIC 27MG-0.4MG
1 TABLET ORAL DAILY
Status: CANCELLED | OUTPATIENT
Start: 2024-11-16

## 2024-11-15 RX ORDER — LORAZEPAM 1 MG/1
2 TABLET ORAL
Status: DISCONTINUED | OUTPATIENT
Start: 2024-11-15 | End: 2024-11-18

## 2024-11-15 RX ORDER — LORAZEPAM 1 MG/1
3 TABLET ORAL
Status: DISCONTINUED | OUTPATIENT
Start: 2024-11-15 | End: 2024-11-18

## 2024-11-15 RX ORDER — POLYETHYLENE GLYCOL 3350 17 G/17G
17 POWDER, FOR SOLUTION ORAL DAILY PRN
Status: CANCELLED | OUTPATIENT
Start: 2024-11-15

## 2024-11-15 RX ORDER — ONDANSETRON 4 MG/1
4 TABLET, ORALLY DISINTEGRATING ORAL EVERY 8 HOURS PRN
Status: CANCELLED | OUTPATIENT
Start: 2024-11-15

## 2024-11-15 RX ORDER — LANOLIN ALCOHOL/MO/W.PET/CERES
100 CREAM (GRAM) TOPICAL DAILY
Status: CANCELLED | OUTPATIENT
Start: 2024-11-16

## 2024-11-15 RX ORDER — CHLORDIAZEPOXIDE HYDROCHLORIDE 25 MG/1
25 CAPSULE, GELATIN COATED ORAL EVERY 6 HOURS
Status: DISCONTINUED | OUTPATIENT
Start: 2024-11-15 | End: 2024-11-15

## 2024-11-15 RX ORDER — LANOLIN ALCOHOL/MO/W.PET/CERES
100 CREAM (GRAM) TOPICAL DAILY
Status: DISCONTINUED | OUTPATIENT
Start: 2024-11-16 | End: 2024-11-23 | Stop reason: HOSPADM

## 2024-11-15 RX ORDER — NICOTINE 21 MG/24HR
1 PATCH, TRANSDERMAL 24 HOURS TRANSDERMAL DAILY
Status: CANCELLED | OUTPATIENT
Start: 2024-11-16

## 2024-11-15 RX ORDER — FOLIC ACID 1 MG/1
1 TABLET ORAL DAILY
Status: CANCELLED | OUTPATIENT
Start: 2024-11-16

## 2024-11-15 RX ORDER — PREGABALIN 75 MG/1
75 CAPSULE ORAL 2 TIMES DAILY
Status: DISCONTINUED | OUTPATIENT
Start: 2024-11-15 | End: 2024-11-23 | Stop reason: HOSPADM

## 2024-11-15 RX ORDER — ACETAMINOPHEN 325 MG/1
650 TABLET ORAL EVERY 6 HOURS PRN
Status: DISCONTINUED | OUTPATIENT
Start: 2024-11-15 | End: 2024-11-23 | Stop reason: HOSPADM

## 2024-11-15 RX ORDER — BISACODYL 10 MG
10 SUPPOSITORY, RECTAL RECTAL DAILY PRN
Status: CANCELLED | OUTPATIENT
Start: 2024-11-15

## 2024-11-15 RX ORDER — CHLORDIAZEPOXIDE HYDROCHLORIDE 25 MG/1
25 CAPSULE, GELATIN COATED ORAL EVERY 6 HOURS
Status: CANCELLED | OUTPATIENT
Start: 2024-11-15

## 2024-11-15 RX ORDER — LORAZEPAM 2 MG/ML
4 INJECTION INTRAMUSCULAR
Status: DISCONTINUED | OUTPATIENT
Start: 2024-11-15 | End: 2024-11-18

## 2024-11-15 RX ORDER — BISACODYL 10 MG
10 SUPPOSITORY, RECTAL RECTAL DAILY PRN
Status: DISCONTINUED | OUTPATIENT
Start: 2024-11-15 | End: 2024-11-23 | Stop reason: HOSPADM

## 2024-11-15 RX ORDER — LORAZEPAM 2 MG/1
2 TABLET ORAL
Status: CANCELLED | OUTPATIENT
Start: 2024-11-15

## 2024-11-15 RX ORDER — ONDANSETRON 4 MG/1
4 TABLET, ORALLY DISINTEGRATING ORAL EVERY 8 HOURS PRN
Status: DISCONTINUED | OUTPATIENT
Start: 2024-11-15 | End: 2024-11-23 | Stop reason: HOSPADM

## 2024-11-15 RX ORDER — LORAZEPAM 1 MG/1
1 TABLET ORAL
Status: DISCONTINUED | OUTPATIENT
Start: 2024-11-15 | End: 2024-11-18

## 2024-11-15 RX ORDER — ACETAMINOPHEN 325 MG/1
650 TABLET ORAL EVERY 6 HOURS PRN
Status: CANCELLED | OUTPATIENT
Start: 2024-11-15

## 2024-11-15 RX ORDER — POLYETHYLENE GLYCOL 3350 17 G/17G
17 POWDER, FOR SOLUTION ORAL DAILY PRN
Status: DISCONTINUED | OUTPATIENT
Start: 2024-11-15 | End: 2024-11-23 | Stop reason: HOSPADM

## 2024-11-15 RX ORDER — M-VIT,TX,IRON,MINS/CALC/FOLIC 27MG-0.4MG
1 TABLET ORAL DAILY
Status: DISCONTINUED | OUTPATIENT
Start: 2024-11-16 | End: 2024-11-23 | Stop reason: HOSPADM

## 2024-11-15 RX ORDER — ACETAMINOPHEN 650 MG/1
650 SUPPOSITORY RECTAL EVERY 6 HOURS PRN
Status: DISCONTINUED | OUTPATIENT
Start: 2024-11-15 | End: 2024-11-15

## 2024-11-15 RX ORDER — ENOXAPARIN SODIUM 100 MG/ML
40 INJECTION SUBCUTANEOUS DAILY
Status: DISCONTINUED | OUTPATIENT
Start: 2024-11-16 | End: 2024-11-22

## 2024-11-15 RX ORDER — ACETAMINOPHEN 650 MG/1
650 SUPPOSITORY RECTAL EVERY 6 HOURS PRN
Status: CANCELLED | OUTPATIENT
Start: 2024-11-15

## 2024-11-15 RX ORDER — CHLORDIAZEPOXIDE HYDROCHLORIDE 25 MG/1
25 CAPSULE, GELATIN COATED ORAL EVERY 6 HOURS
Status: COMPLETED | OUTPATIENT
Start: 2024-11-15 | End: 2024-11-17

## 2024-11-15 RX ORDER — LORAZEPAM 2 MG/ML
1 INJECTION INTRAMUSCULAR
Status: DISCONTINUED | OUTPATIENT
Start: 2024-11-15 | End: 2024-11-18

## 2024-11-15 RX ORDER — MECOBALAMIN 5000 MCG
5 TABLET,DISINTEGRATING ORAL NIGHTLY PRN
Status: DISCONTINUED | OUTPATIENT
Start: 2024-11-15 | End: 2024-11-23 | Stop reason: HOSPADM

## 2024-11-15 RX ORDER — PREGABALIN 25 MG/1
75 CAPSULE ORAL 2 TIMES DAILY
Status: CANCELLED | OUTPATIENT
Start: 2024-11-15

## 2024-11-15 RX ADMIN — CHLORDIAZEPOXIDE HYDROCHLORIDE 25 MG: 25 CAPSULE ORAL at 21:06

## 2024-11-15 RX ADMIN — PREGABALIN 75 MG: 25 CAPSULE ORAL at 09:10

## 2024-11-15 RX ADMIN — Medication 1 TABLET: at 09:10

## 2024-11-15 RX ADMIN — ENOXAPARIN SODIUM 40 MG: 100 INJECTION SUBCUTANEOUS at 09:10

## 2024-11-15 RX ADMIN — CHLORDIAZEPOXIDE HYDROCHLORIDE 25 MG: 25 CAPSULE ORAL at 09:11

## 2024-11-15 RX ADMIN — FOLIC ACID 1 MG: 1 TABLET ORAL at 09:11

## 2024-11-15 RX ADMIN — PREGABALIN 75 MG: 75 CAPSULE ORAL at 21:06

## 2024-11-15 RX ADMIN — CHLORDIAZEPOXIDE HYDROCHLORIDE 25 MG: 25 CAPSULE ORAL at 01:37

## 2024-11-15 RX ADMIN — Medication 100 MG: at 09:10

## 2024-11-15 RX ADMIN — CHLORDIAZEPOXIDE HYDROCHLORIDE 25 MG: 25 CAPSULE ORAL at 14:27

## 2024-11-15 ASSESSMENT — PAIN SCALES - GENERAL
PAINLEVEL_OUTOF10: 0
PAINLEVEL_OUTOF10: 4
PAINLEVEL_OUTOF10: 0

## 2024-11-15 ASSESSMENT — PAIN DESCRIPTION - LOCATION: LOCATION: NECK

## 2024-11-15 ASSESSMENT — PAIN DESCRIPTION - ORIENTATION: ORIENTATION: RIGHT;POSTERIOR

## 2024-11-15 ASSESSMENT — PAIN DESCRIPTION - DESCRIPTORS: DESCRIPTORS: DULL

## 2024-11-15 NOTE — DISCHARGE SUMMARY
Name:  Fernando Abdullahi  Room:  3009/3009-01  MRN:    [id number]    Discharge Summary      This discharge summary is in conjunction with a complete physical exam done on the day of discharge.      Discharging Physician: ROMI LEWIS MD      Admit: 11/7/2024  Discharge:  11/15/2024     Diagnoses this Admission    Principal Problem:    Seizure (HCC)  Active Problems:    Alcohol withdrawal syndrome with complication (HCC)    Thrombocytopenia (HCC)    Elevated LFTs  Resolved Problems:    * No resolved hospital problems. *          Procedures (Please Review Full Report for Details)      Consults    IP CONSULT TO NEUROLOGY  IP CONSULT TO SOCIAL WORK  IP CONSULT TO CASE MANAGEMENT  IP CONSULT TO DIETITIAN      HPI:   A 29-year-old male with a history of alcohol abuse presented to the emergency room due to an observed seizure at home.  He had 2 more seizures while in the emergency room.  Given IV Ativan.  Admitted to the ICU.  Has not had any further seizures      Physical Exam at Discharge:  /87   Pulse 82   Temp 98.9 °F (37.2 °C) (Temporal)   Resp 12   Ht 1.854 m (6' 0.99\")   Wt 67.7 kg (149 lb 3.2 oz)   SpO2 98%   BMI 19.69 kg/m²     General:  young male  awake, alert and oriented    . Appears to be not in any distress  Mucous Membranes:  Pink , anicteric  Neck: No JVD, no carotid bruit, no thyromegaly  Chest:  Clear to auscultation bilaterally, no added sounds  Cardiovascular:  RRR S1S2 heard, no murmurs or gallops  Abdomen:  Soft, undistended, non tender, no organomegaly, BS present  Extremities: No edema or cyanosis. Distal pulses well felt  Neurological : calm and cooperative at this time, remains in restraints, answering some questions appropriately   Moving all ext  Gen weakness with ext tremors       Hospital Course        Alcohol withdrawal seizures  History of alcohol abuse  Admitted to the ICU  Was given IV Ativan and a dose of phenobarbital in the emergency room  Transferred to ICU for

## 2024-11-15 NOTE — PROGRESS NOTES
Inpatient Physical Therapy Evaluation & Treatment    Unit: ICU  Date:  11/15/2024  Patient Name:    Fernando Abdullahi  Admitting diagnosis:  Seizure (HCC) [R56.9]  Alcohol withdrawal syndrome with complication (HCC) [F10.939]  Admit Date:  11/7/2024  Precautions/Restrictions/WB Status/ Lines/ Wounds/ Oxygen: Fall risk, Bed/chair alarm, Lines (IV), and Telemetry      Pt seen for cotreatment this date due to patient safety, patient endurance, complexity of condition, and acute illness/injury    Treatment Time:  7100 - 5329  Treatment Number:  3   Timed Code Treatment Minutes: 53 minutes  Total Treatment Minutes:  53  minutes    Patient Stated Goals for Therapy: none stated            Discharge Recommendations: ARU/IRF (inpatient rehab facility)   DME needs for discharge: Defer to facility         Therapy recommendation for EMS Transport: requires transport by cot due to pt needs A x 2 for safe transfers and pt with poor sitting balance/tolerance     Therapy recommendations for staff:   Assist of 2 for ambulation with use of rolling walker (RW) and gait belt to/from chair  to/from bathroom  within room  within halls     History of Present Illness:   Per Dr. Whitley H&P 11/8/24:  \"29-year-old male with a history of alcohol abuse presented to the emergency room due to an observed seizure at home.  He had 2 more seizures while in the emergency room.  Given IV Ativan.  Admitted to the ICU.  Has not had any further seizures\"     Preadmission Environment:   Pt. Lives                                              alone  Home environment:                            apartment , ground floor   Steps to enter first floor:                     No steps  Steps to second floor/basement:        N/A  Laundry:                                              1st floor  Bathroom:                                           tub/shower unit, grab bars in shower, and standard height toilet  Pt sleeps in a:                                     Flat

## 2024-11-15 NOTE — PROGRESS NOTES
Discussion had with patient and mother regarding discharge and they are aware that ARU has been called for d/c.  Discussed with pt and mother as mother brought up next steps of going to inpatient treatment after ARU. Pt states that he is reluctant to go. Discussed enabling behaviors of those around pt and encouraged both parents to explore treatment themselves at they may not know they are enabling pt. Mother and pt very receptive and mother states that she has strong desire to see pt succeed.

## 2024-11-15 NOTE — PROGRESS NOTES
Tested  LE:  Not Tested    Eating:   Not Tested    Toileting:  Not Tested    Grooming/hygiene: Independent brushing hair     Ther Ex / Activities Initiated:   Dynamic balance activities including step and reach outside base of support to either side laterally, as well as to front; dynamic balance activities with feet in static placement, reaching across midline and outside base of support on same side.    Positioning Needs:   Pt in bed and ICU monitoring  Call light provided and all needs within reach    Patient/Family Education:   Pt educated on role of inpatient OT, plan of care, importance of continued activity, DC recommendations, functional transfer/mobility safety, transfer techniques, and calling for assist with mobility    CHF Education  N/A    Assessment:  Pt seen for occupational therapy followup session in the acute care setting.  Pt limited by decreased balance, strength this date. Making good progress toward goals. Pt continues to demonstrate posterior balance losses and requires cues to try to keep weight forward over the balls of his feet.  Worked on dynamic balance challenges with improvement noted as tasks progressed.  Improved bimanual coordination during sock donning this date, as well as improved overall efficiency of task.  Pt functioning below baseline and will likely benefit from continued skilled occupational therapy services to maximize safety and independence.     Recommending ARU/IRF/Inpatient Rehab Facility upon discharge as patient functioning below baseline, demonstrates good rehab potential and unable to return home due to burden of care beyond caregiver ability and home environment not conducive to patient recovery.    Goal(s) : 11/15/24: all goals ongoing  To be met in 3 Visits:  Bed to toilet/BSC:                                                                   SBA     To be met in 5 Visits:  Supine to/from Sit in preparation for ADL task:                       Independent  Toileting                                                                                  SBA  Grooming                                                                                Independent  Upper Body Dressing:                                                            Independent  Lower Body Dressing:                                                            SBA  Pt to demonstrate UE therapeutic exs x 15 reps with minimal cues     Rehabilitation Potential: Good  Strengths for achieving goals include: PLOF, Family Support, and Pt cooperative   Barriers to achieving goals include:  Complexity of condition and Weakness     Plan:  To be seen 3-5 x/ week while in acute care setting for therapeutic exercises/activities, bed mobility training, functional transfer training, family/patient education, ADL/IADL retraining, balance training, and neuromuscular reeducation ..    Electronically signed by Milvia Olmos OT on 11/15/2024 at 3:47 PM      If patient discharges from this facility prior to next visit, this note will serve as the Discharge Summary

## 2024-11-15 NOTE — PROGRESS NOTES
Blood pressure (!) 139/96, pulse 82, temperature 98.6 °F (37 °C), temperature source Tympanic, resp. rate 14, height 1.854 m (6' 1\"), weight 66.9 kg (147 lb 8 oz), SpO2 99%.    Patient is awake and alert watching T.V on his Tablet.   Patient denies pain or discomfort at this time.  He is weak and off balance when he stands but is able to walk to the toilet with 2 assist.   Tremors improving. Not as noticeable today.   Patient is following commands better.    Shift assessment complete. See doc flow. Nightly medications given see MAR. Patient with no complaints at this time. Call light and bedside table within easy reach.

## 2024-11-15 NOTE — PROGRESS NOTES
Report called to ARU yenny.   Spoke with [facility] who accepts pt and all questions answered. ARU aware of 1515 pickup time scheduled.

## 2024-11-15 NOTE — PROGRESS NOTES
Speech-Language Pathology  Swallowing Disorders and Dysphagia     SLP Attempt Note           Name: Fernando Abdullahi  : 1995  Medical Diagnosis: Seizure (HCC) [R56.9]  Alcohol withdrawal syndrome with complication (HCC) [F10.939]    Attempting to see pt for cog f/u. Pt actively discharging to ARU at this time. No charges filed. Thank you,      Dottie Leos M.A., CCC-SLP   Speech-Language Pathologist #[id number]  Speech Pathology and Swallowing Disorders  P: (inpatient): [id number] (speech desk): [phone]  E: [email or web address]  Certified to provide:  FEES, MBS, Vital Stim, and Lux Dysphagia Therapy Program (MDTP)

## 2024-11-15 NOTE — H&P
Patient: Fernando Abdullahi  [id number]  Date: 11/15/2024      Chief Complaint: seizures    History of Present Illness/Hospital Course:  Fernando Abdullahi is a 29 year old male with a past medical history significant for alcohol abuse who presented to [facility] on 11/8/24 after having a seizure at home. He had two additional episodes in the ED. CT head was negative for acute process. Neurology was consulted. EEG did not reveal any seizure activity. Hospital course was complicated by agitation requiring precedex. He was started on vimpat and lyrica for short term seizure prevention. He was admitted to [facility] on 11/15/24 due to functional deficits below his baseline. Today Fernando is seen with nursing and his parents present. He reports feeling back to his baseline, but his parents notes that he is still somewhat confused. He reports some numbness on the back of his legs bilaterally. He reports that his last bowel movement was last night. He denies urinary complaints. He lives alone in a single level apartment with no stairs to enter. He is motivated to work with therapies. His parents are hopeful for him to discharge to alcohol rehab. He works at a bank and enjoys reading mysteries.     Prior Level of Function:  Independent for self care, indoor mobility, stairs, functional cognition     Current Level of Function:  Supervision for lying to sitting on side of bed  Mod assist for sit to lying  Dependent for sit to stand, walk 10 feet     has no past medical history on file.     has no past surgical history on file.     reports current alcohol use of about 6.0 standard drinks of alcohol per week. He reports that he does not currently use drugs.    family history is not on file.    Current Facility-Administered Medications   Medication Dose Route Frequency Provider Last Rate Last Admin    acetaminophen (TYLENOL) tablet 650 mg  650 mg Oral Q6H PRN Reva Gloria MD        [START ON 11/16/2024] enoxaparin (LOVENOX) injection 40 mg  gallop noted  Resp: Lungs clear to auscultation bilaterally, no rales wheezes or ronchi, no retractions. Respirations unlabored.   GI: Soft, nontender, nondistended. Normal bowel sounds. No palpable masses.   Neuro: Alert, oriented, appropriate.Delayed processing. No cranial nerve deficits appreciated. Sensation intact to light touch. Motor examination reveals normal strength in all four limbs diffusely. No abnormalities with finger/nose or heel/shin noted. Reflexes normal and symmetric.  Skin: Normal temperature and turgor  MSK: No joint abnormalities noted.     Ext: No significant edema appreciated. No varicosities.    Lab Results   Component Value Date    WBC 3.3 (L) 11/14/2024    HGB 12.6 (L) 11/14/2024    HCT 36.0 (L) 11/14/2024    MCV 90.6 11/14/2024     11/14/2024     No results found for: \"INR\", \"PROTIME\"  Lab Results   Component Value Date    CREATININE 0.5 (L) 11/14/2024    BUN 7 11/14/2024     (L) 11/14/2024    K 3.6 11/14/2024    CL 98 (L) 11/14/2024    CO2 23 11/14/2024     Lab Results   Component Value Date    ALT 59 (H) 11/07/2024     (H) 11/07/2024    ALKPHOS 79 11/07/2024    BILITOT 0.7 11/07/2024     IMAGING    CT Head WO contrast 11/7/24  No acute intracranial abnormality.     The above laboratory data have been reviewed.   The above imaging data have been reviewed.   The above medical testing have been reviewed.     Body mass index is 19.33 kg/m².    Barriers to Discharge: level of assistance, endurance, comorbidities    POST ADMISSION PHYSICIAN EVALUATION  The patient has agreed to being admitted to our comprehensive inpatient rehabilitation facility consisting of at least 180 minutes of therapy a day, 5 out of 7 days a week.     The patient/family has a good understanding of our discharge process. The patient has potential to make improvement and is in need of at least two of the following multidisciplinary therapies including but not limited to physical, occupational,

## 2024-11-15 NOTE — PROGRESS NOTES
psychological cause or life stress as evidenced by poor intake prior to admission    Nutrition Interventions:   Food and/or Nutrient Delivery: Continue Current Diet  Nutrition Education/Counseling: No recommendation at this time  Coordination of Nutrition Care: Continue to monitor while inpatient, Coordination of Care       Goals:  Goals: Meet at least 75% of estimated needs  Type of Goal: New goal  Previous Goal Met: New Goal    Nutrition Monitoring and Evaluation:   Behavioral-Environmental Outcomes: Readiness for Change  Food/Nutrient Intake Outcomes: Food and Nutrient Intake  Physical Signs/Symptoms Outcomes: Biochemical Data, Meal Time Behavior, Nutrition Focused Physical Findings, Skin, Weight    Discharge Planning:    Continue current diet     Romy Gusman RD, LD  Contact: [phone]

## 2024-11-15 NOTE — CARE COORDINATION
Reviewed chart, spoke with [facility], updated MD not on floor yet but will give him P2P info once he arrives. States should pt get approved, he will need to arrive at [facility] by 4 pm today or will need to be held here until Sunday. Advised once MD is on floor, will update him. CM following.    1115 - Received call [facility]. Precert is back. Plan for  at 1530. Tiffanie ARCHULETA made aware.

## 2024-11-15 NOTE — PROGRESS NOTES
Patient put call light on appropriately for staff to come help him get out of bed to use the urinal. Patient is still off balance when stands. Pt is assist x 2.   Denies pain or discomfort at this time.

## 2024-11-15 NOTE — CARE COORDINATION
CASE MANAGEMENT DISCHARGE SUMMARY      Discharge to: [facility]    Precertification completed: Yes    Hospital Exemption Notification (HENS) completed: NA - ARU    IMM given: NA - [employer]      Transportation:      Medical Transport explained to pt/family. Pt/family voice no agency preference.      Agency used: [facility]     time: 1530     Ambulance form completed: Yes    Confirmed discharge plan with:     Patient: yes     Family: yes - parents at bedside     Facility/Agency, name: [facility]   Phone number for report to facility: [phone]     RN name: Tiffanie ARCHULETA    Note: Discharging nurse to complete FERNIE, reconcile AVS, and place final copy with patient's discharge packet. RN to ensure that written prescriptions for  Level II medications are sent with patient to the facility as per protocol.

## 2024-11-15 NOTE — PROGRESS NOTES
Pt consuming meds at this time whole with water.   Parents remain in room. Pt states that he does not want to eat at this time and would like to keep tray at bedside. Yogurt placed in refrigerator for pt.

## 2024-11-15 NOTE — PLAN OF CARE
Problem: Discharge Planning  Goal: Discharge to home or other facility with appropriate resources  Outcome: Progressing     Problem: Safety - Adult  Goal: Free from fall injury  Outcome: Progressing     Problem: Confusion  Goal: Confusion, delirium, dementia, or psychosis is improved or at baseline  Description: INTERVENTIONS:  1. Assess for possible contributors to thought disturbance, including medications, impaired vision or hearing, underlying metabolic abnormalities, dehydration, psychiatric diagnoses, and notify attending LIP  2. [place] high risk fall precautions, as indicated  3. Provide frequent short contacts to provide reality reorientation, refocusing and direction  4. Decrease environmental stimuli, including noise as appropriate  5. Monitor and intervene to maintain adequate nutrition, hydration, elimination, sleep and activity  6. If unable to ensure safety without constant attention obtain sitter and review sitter guidelines with assigned personnel  7. Initiate Psychosocial CNS and Spiritual Care consult, as indicated  Outcome: Progressing

## 2024-11-15 NOTE — PROGRESS NOTES
NURSING ASSESSMENT: ARU ADMISSION  [facility]    Patient:Fernando Abdullahi     Rehab Dx/Hx: Seizure (HCC) [R56.9]   :1995  MRN:[id number]  Date of Admit: 11/15/2024  Room #: 0158/0158-01    Subjective:   Patient admitted to [id number] from [place] via stretcher.   Alert and oriented x4.  Oriented to room and call light system. Oriented to rehab routine and therapy schedules. Informed about care conferences and ordering of meals with PCA.    Drug / Medication Review:   Medications were reviewed by RN at time of admission  [x]  No potential or actual clinically significant medication issues were noted.   []  Potential or actual clinically significant medication issues were found and MD was notified. (Specified below if applicable)   []  Allergy to medication   []  Drug interactions (drug/drug, drug/food, drug/disease interactions)   []  Duplicate drug   []  Omission (drug missing from prescribed regimen)   []  Non adherence   []  Adverse reaction   []  Wrong patient, drug, dose, route, time error   []  Ineffective drug therapy    Section GG: Rolling Right and Left   [x]  Code 06, Independent: if the patient completes the activity by themself with no assistance from a helper.   []  Code 05, Setup or clean up assist: if the helper sets up or cleans up; patient completes activity   []  Code 04, Supervision or touching assist: If the helper provides verbal cues and/or touching/steadying and/or contact guard assistance as patient completes activity   []  Code 03, Partial/Moderate Assist: If the helper does LESS THAN HALF the effort. [place] lifts, holds, or supports trunk or limbs, but provides less than half the effort.   []  Code 02, Substantial/Maximal Assist: if the helper does MORE THAN HALF the effort. [place] lifts or holds trunk or limbs and provides more than half the effort.  []  Code 01,Dependent: If the helper does ALL of the effort. Patient does none of the effort to complete the

## 2024-11-15 NOTE — PROGRESS NOTES
[facility] Admission Assessment  [facility] Acute Rehab Unit    Patient:Fernando Abdullahi     Rehab Dx/Hx: Seizure (HCC) [R56.9]   :1995  MRN:[id number]  Date of Admit: 11/15/2024     Pain Effect on Sleep:  Over the past 5 days, how much of the time has pain made it hard for you to sleep at night?  []  0. Does not apply - I have not had any pain or hurting in the past 5 days  [x]  1. Rarely or not at all  []  2. Occasionally  []  3. Frequently  []  4. Almost constantly  []  8. Unable to answer    Over the past 5 days, how often have you limited your participation in rehabilitation therapy sessions due to pain?  []  0. Does not apply - I have not received rehabilitation therapy in the past 5 days  [x]  1. Rarely or not at all  []  2. Occasionally  []  3. Frequently  []  4. Almost constantly  []  8. Unable to answer    Pain Interference with Day-to-Day Activities:  Over the past 5 days, how often have you limited your day-to-day activities (excluding rehabilitation therapy session)?  [x]  1. Rarely or not at all  []  2. Occasionally  []  3. Frequently  []  4. Almost constantly  []  8. Unable to answer      High-Risk Drug Classes: Use and Indication   Is taking: Check if the pt is taking any medications by pharmacological classification  Indication noted: If column 1 is checked, check if there is an indication noted for all meds in the drug class Is taking  (check all that apply) Indication noted (check all that apply)   Antipsychotic [] []   Anticoagulant [x] [x]   Antibiotic [] []   Opioid [] []   Antiplatelet [] []   Hypoglycemic (including insulin) [] []   None of the above [] []     Special Treatments, Procedures, and Programs   Check all of the following treatments, procedures, and programs that apply on admission.  On admission (check all that apply)   Cancer Treatments    A1. Chemotherapy []   A2. IV []   A3. Oral []   A10. Other []   B1. Radiation []   Respiratory Therapies    C1. Oxygen Therapy []   C2.

## 2024-11-16 LAB
ANION GAP SERPL CALCULATED.3IONS-SCNC: 12 MMOL/L (ref 3–16)
BASOPHILS # BLD: 0.1 K/UL (ref 0–0.2)
BASOPHILS NFR BLD: 2.5 %
BUN SERPL-MCNC: 9 MG/DL (ref 7–20)
CALCIUM SERPL-MCNC: 9.2 MG/DL (ref 8.3–10.6)
CHLORIDE SERPL-SCNC: 98 MMOL/L (ref 99–110)
CO2 SERPL-SCNC: 25 MMOL/L (ref 21–32)
CREAT SERPL-MCNC: 0.6 MG/DL (ref 0.9–1.3)
DEPRECATED RDW RBC AUTO: 14.2 % (ref 12.4–15.4)
EOSINOPHIL # BLD: 0.1 K/UL (ref 0–0.6)
EOSINOPHIL NFR BLD: 2.2 %
GFR SERPLBLD CREATININE-BSD FMLA CKD-EPI: >90 ML/MIN/{1.73_M2}
GLUCOSE SERPL-MCNC: 112 MG/DL (ref 70–99)
HCT VFR BLD AUTO: 35.8 % (ref 40.5–52.5)
HGB BLD-MCNC: 12.1 G/DL (ref 13.5–17.5)
LYMPHOCYTES # BLD: 1 K/UL (ref 1–5.1)
LYMPHOCYTES NFR BLD: 20.2 %
MCH RBC QN AUTO: 30.7 PG (ref 26–34)
MCHC RBC AUTO-ENTMCNC: 33.9 G/DL (ref 31–36)
MCV RBC AUTO: 90.4 FL (ref 80–100)
MONOCYTES # BLD: 0.9 K/UL (ref 0–1.3)
MONOCYTES NFR BLD: 17.6 %
NEUTROPHILS # BLD: 2.9 K/UL (ref 1.7–7.7)
NEUTROPHILS NFR BLD: 57.5 %
PLATELET # BLD AUTO: 281 K/UL (ref 135–450)
PMV BLD AUTO: 8.1 FL (ref 5–10.5)
POTASSIUM SERPL-SCNC: 3.6 MMOL/L (ref 3.5–5.1)
RBC # BLD AUTO: 3.95 M/UL (ref 4.2–5.9)
SODIUM SERPL-SCNC: 135 MMOL/L (ref 136–145)
WBC # BLD AUTO: 5 K/UL (ref 4–11)

## 2024-11-16 PROCEDURE — 80048 BASIC METABOLIC PNL TOTAL CA: CPT

## 2024-11-16 PROCEDURE — 97162 PT EVAL MOD COMPLEX 30 MIN: CPT

## 2024-11-16 PROCEDURE — 6370000000 HC RX 637 (ALT 250 FOR IP): Performed by: STUDENT IN AN ORGANIZED HEALTH CARE EDUCATION/TRAINING PROGRAM

## 2024-11-16 PROCEDURE — 97166 OT EVAL MOD COMPLEX 45 MIN: CPT

## 2024-11-16 PROCEDURE — 36415 COLL VENOUS BLD VENIPUNCTURE: CPT

## 2024-11-16 PROCEDURE — 92610 EVALUATE SWALLOWING FUNCTION: CPT

## 2024-11-16 PROCEDURE — 97530 THERAPEUTIC ACTIVITIES: CPT

## 2024-11-16 PROCEDURE — 85025 COMPLETE CBC W/AUTO DIFF WBC: CPT

## 2024-11-16 PROCEDURE — 97116 GAIT TRAINING THERAPY: CPT

## 2024-11-16 PROCEDURE — 6360000002 HC RX W HCPCS: Performed by: STUDENT IN AN ORGANIZED HEALTH CARE EDUCATION/TRAINING PROGRAM

## 2024-11-16 PROCEDURE — 96125 COGNITIVE TEST BY HC PRO: CPT

## 2024-11-16 PROCEDURE — 1280000000 HC REHAB R&B

## 2024-11-16 PROCEDURE — 97535 SELF CARE MNGMENT TRAINING: CPT

## 2024-11-16 RX ADMIN — CHLORDIAZEPOXIDE HYDROCHLORIDE 25 MG: 25 CAPSULE ORAL at 14:24

## 2024-11-16 RX ADMIN — PREGABALIN 75 MG: 75 CAPSULE ORAL at 20:00

## 2024-11-16 RX ADMIN — FOLIC ACID 1 MG: 1 TABLET ORAL at 09:18

## 2024-11-16 RX ADMIN — Medication 1 TABLET: at 09:18

## 2024-11-16 RX ADMIN — CHLORDIAZEPOXIDE HYDROCHLORIDE 25 MG: 25 CAPSULE ORAL at 20:00

## 2024-11-16 RX ADMIN — CHLORDIAZEPOXIDE HYDROCHLORIDE 25 MG: 25 CAPSULE ORAL at 02:02

## 2024-11-16 RX ADMIN — CHLORDIAZEPOXIDE HYDROCHLORIDE 25 MG: 25 CAPSULE ORAL at 09:18

## 2024-11-16 RX ADMIN — PREGABALIN 75 MG: 75 CAPSULE ORAL at 09:18

## 2024-11-16 RX ADMIN — ENOXAPARIN SODIUM 40 MG: 100 INJECTION SUBCUTANEOUS at 09:18

## 2024-11-16 RX ADMIN — Medication 100 MG: at 09:18

## 2024-11-16 ASSESSMENT — PAIN SCALES - GENERAL: PAINLEVEL_OUTOF10: 0

## 2024-11-16 NOTE — PROGRESS NOTES
Occupational Therapy  Facility/Department: [facility]  Rehabilitation Occupational Therapy Evaluation       Date: 24  Patient Name: Fernando Abdullahi       Room: 0154/0154-01   : 1995  (29 y.o.) Gender: male                Restrictions  Restrictions/Precautions: Up as Tolerated;Seizure;Fall Risk  Other position/activity restrictions: telesitter    Subjective   Pt in bed upon OT arrival, agreeable to eval. /71 HR 92, O2 98% on RA.  Pt was bathed during OT session this date. Pt was Showered with soap/water with Minimal Assist.      Objective  Vision - Basic Assessment  Prior Vision: Wears glasses all the time  Patient Visual Report: Other (add comment) (Pt reports slightly \"spotty\" vision, but reports it has been improving)      Sensation  Overall Sensation Status: Impaired (BUEs WNL, BLE numbness from foot up to knee)     ROM  LUE AROM (degrees)  LUE AROM : WNL  RUE AROM (degrees)  RUE AROM : WNL    Functional Mobility  Shower Transfers  Shower Transfers: Minimal assistance  Shower Transfers Comments: Ambulatory transfer; use of grab bar to sit/stand from shower bench    Social/Functional History  Lives With: Alone  Type of Home: Apartment  Home Layout: One level  Home Access: Level entry  Bathroom Shower/Tub: Tub/Shower unit  Bathroom Toilet: Standard  Bathroom Equipment: Grab bars in shower  Has the patient had two or more falls in the past year or any fall with injury in the past year?: No  ADL Assistance: Independent  Homemaking Assistance: Independent  Ambulation Assistance: Independent  Active : Yes  Occupation: Full time employment  Type of Occupation: Works at bank  Leisure & Hobbies: Hunt, fish, 4 wheel, read    ADL  Grooming: Minimal assistance  Grooming Skilled Clinical Factors: In stance @  sink to brush hair and brush teeth; Min A to correct posterior LOB  UE Bathing: Supervision  UE Bathing Skilled Clinical Factors: seated on shower bench  LE Bathing: Minimal assistance  LE Bathing  Recommendations: Strengthening;Balance training;Functional mobility training;Endurance training;Patient/Caregiver education & training;Safety education & training;Pain management;Equipment evaluation, education, & procurement;Positioning;Modalities;Self-Care / ADL;Home management training       Therapy Time   Individual Concurrent Group Co-treatment   Time In 1000         Time Out 1130         Minutes 90         Timed Code Treatment Minutes: 75 Minutes (15 min eval)       Sarah Small, OT

## 2024-11-16 NOTE — PROGRESS NOTES
Physical Therapy  Facility/Department: [facility]  Rehabilitation Physical Therapy Initial Assessment    NAME: Fernando Abdullahi  : 1995 (29 y.o.)  MRN: [id number]  CODE STATUS: Full Code    Date of Service: 24    No past medical history on file.  No past surgical history on file.    Chart Reviewed: Yes  Patient assessed for rehabilitation services?: Yes  Family / Caregiver Present: No  Referring Practitioner: Reva Gloria MD  Referral Date : 11/15/24  Diagnosis: seizure    Restrictions:  Restrictions/Precautions: Up as Tolerated;Seizure;Fall Risk  Position Activity Restriction  Other position/activity restrictions: telesitter     SUBJECTIVE  Subjective: pt found in bed, agreeable to PT initial evaluation, cleared for eval by RN.  Pain: denies pain at rest.     Post Treatment Pain Screening: denies pain throughout and at end of treatment.     Prior Level of Function: IND with all mobility without AD, works FT in a bank, spends time outdoors hunting and fishing, no deficits.  Social/Functional History  Lives With: Alone  Type of Home: Apartment  Home Layout: One level  Home Access: Level entry  Bathroom Shower/Tub: Tub/Shower unit  Bathroom Toilet: Standard  Bathroom Equipment: Grab bars in shower  Has the patient had two or more falls in the past year or any fall with injury in the past year?: No  ADL Assistance: Independent  Homemaking Assistance: Independent  Ambulation Assistance: Independent  Active : Yes  Occupation: Full time employment  Type of Occupation: Works at bank  Leisure & Hobbies: Hunt, fish, 4 wheel, read  OBJECTIVE  Vision  Vision: Impaired  Vision Exceptions: Wears glasses for reading;Wears glasses for distance;Wears glasses at all times    Hearing  Hearing: Within functional limits    ROM  PROM RLE (degrees)  RLE PROM: WFL  AROM RLE (degrees)  RLE AROM: WFL  PROM LLE (degrees)  LLE PROM: WFL  AROM LLE (degrees)  LLE AROM : WFL    Strength  Strength RLE  Strength RLE:

## 2024-11-16 NOTE — PLAN OF CARE
SLP completed evaluation. Please refer to notes in EMR.    Jackelin Monique MA CF-SLP   Speech Language Pathologist

## 2024-11-16 NOTE — PROGRESS NOTES
Speech Language Pathology  Clinical Bedside Swallow Assessment  Facility/Department: [facility]        Recommendations:  Diet recommendation: IDDSI 7 Regular Solids; IDDSI 0 Thin Liquids; Meds whole with thin liquids  Instrumentation: Not clinically indicated at this time  Risk management: upright for all intake, stay upright for at least 30 mins after intake, small bites/sips, oral care 2-3x/day to reduce adverse affects in the event of aspiration, increase physical mobility as able, alternate bites/sips, slow rate of intake, STRICT aspiration precautions, and hold PO and contact SLP if s/s of aspiration or worsening respiratory status develop.      NAME:Fernando Abdullahi  : 1995 (29 y.o.)   MRN: [id number]  ROOM: [address] [place] [address]  ADMISSION DATE: 11/15/2024  PATIENT DIAGNOSIS(ES): Seizure (HCC) [R56.9]  No chief complaint on file.    Patient Active Problem List    Diagnosis Date Noted    Alcohol withdrawal syndrome with complication (HCC) 2024    Thrombocytopenia (HCC) 2024    Elevated LFTs 2024    Seizure (HCC) 2024     No past medical history on file.  No past surgical history on file.  Allergies   Allergen Reactions    Naproxen Hives       DATE ONSET: 11/15/2024    Date of Evaluation: 2024   Evaluating Therapist: EWA Funez    Chart Reviewed: : [x] Yes [] No     Pain: The patient does not complain of pain     Current Diet: ADULT DIET; Regular      Most Recent Imaging    No orders to display         Reason for Referral  Fernando Abdullahi was referred for a bedside swallow evaluation to assess the efficiency of their swallow function, identify signs and symptoms of aspiration and make recommendations regarding safe dietary consistencies, effective compensatory strategies, and safe eating environment.    Assessment    Medical record review/interview: Per MD H&P on 11/15/24:     \" History of Present Illness/Hospital Course:  Fernando Abdullahi is a 29 year old male with a past medical history

## 2024-11-16 NOTE — PLAN OF CARE
Problem: Pain  Goal: Verbalizes/displays adequate comfort level or baseline comfort level  Outcome: Progressing     Problem: Safety - Adult  Goal: Free from fall injury  11/15/2024 2319 by Essence Cortez, RN  Outcome: Progressing  11/15/2024 1609 by Torsten Reynolds, RN  Outcome: Progressing

## 2024-11-16 NOTE — CONSULTS
Comprehensive Nutrition Assessment    Type and Reason for Visit:  Initial, Consult    Nutrition Recommendations/Plan:   Continue regular diet  Encourage po intakes  Add Ensure BID  Monitor po intakes, nutrition adequacy, weights, pertinent labs, BMs     Malnutrition Assessment:  Malnutrition Status:  At risk for malnutrition (11/16/24 1777)      Nutrition Assessment:    Consult for oral nutrition supplements. New ARU pt. Pt with PMHx of alcohol abuse who initially presented to [facility] after having a seizure at home, suspected to be due to alcohol withdrawal. Currently on a regular diet. Pt endorses good appetite now and PTA. Pt reports that he has been eating all of his meals. No significant weight loss noted. Pt reports that he wants to gain some weight while here. Will order ONS to help provide extra kcal and protein. Pt had no questions. Will monitor.    Nutrition Related Findings:      Wound Type: None       Current Nutrition Intake & Therapies:    Average Meal Intake: %  Average Supplements Intake: None Ordered  ADULT DIET; Regular  ADULT ORAL NUTRITION SUPPLEMENT; Breakfast, Dinner; Standard High Calorie/High Protein Oral Supplement    Anthropometric Measures:  Height: 185.4 cm (6' 1\")  Ideal Body Weight (IBW): 184 lbs (84 kg)       Current Body Weight: 66.5 kg (146 lb 8 oz),   IBW. Weight Source: Not specified  Current BMI (kg/m2): 19.3                             BMI Categories: Normal Weight (BMI 18.5-24.9)    Estimated Daily Nutrient Needs:  Energy Requirements Based On: Kcal/kg  Weight Used for Energy Requirements: Current  Energy (kcal/day): 2791-4340 (28-32 kcal/kg (pt wanting to gain a little weight))  Weight Used for Protein Requirements: Current  Protein (g/day): 67-80  Method Used for Fluid Requirements: 1 ml/kcal  Fluid (ml/day): 8113-4606    Nutrition Diagnosis:   No nutrition diagnosis at this time     Nutrition Interventions:   Food and/or Nutrient Delivery: Continue Current Diet,

## 2024-11-17 PROCEDURE — 1280000000 HC REHAB R&B

## 2024-11-17 PROCEDURE — 6370000000 HC RX 637 (ALT 250 FOR IP): Performed by: STUDENT IN AN ORGANIZED HEALTH CARE EDUCATION/TRAINING PROGRAM

## 2024-11-17 PROCEDURE — 6360000002 HC RX W HCPCS: Performed by: STUDENT IN AN ORGANIZED HEALTH CARE EDUCATION/TRAINING PROGRAM

## 2024-11-17 RX ADMIN — PREGABALIN 75 MG: 75 CAPSULE ORAL at 08:59

## 2024-11-17 RX ADMIN — FOLIC ACID 1 MG: 1 TABLET ORAL at 08:59

## 2024-11-17 RX ADMIN — Medication 1 TABLET: at 08:59

## 2024-11-17 RX ADMIN — CHLORDIAZEPOXIDE HYDROCHLORIDE 25 MG: 25 CAPSULE ORAL at 03:11

## 2024-11-17 RX ADMIN — ENOXAPARIN SODIUM 40 MG: 100 INJECTION SUBCUTANEOUS at 08:59

## 2024-11-17 RX ADMIN — Medication 100 MG: at 08:59

## 2024-11-17 RX ADMIN — CHLORDIAZEPOXIDE HYDROCHLORIDE 25 MG: 25 CAPSULE ORAL at 08:59

## 2024-11-17 RX ADMIN — CHLORDIAZEPOXIDE HYDROCHLORIDE 25 MG: 25 CAPSULE ORAL at 14:22

## 2024-11-17 RX ADMIN — PREGABALIN 75 MG: 75 CAPSULE ORAL at 20:53

## 2024-11-17 NOTE — PLAN OF CARE
Problem: Discharge Planning  Goal: Discharge to home or other facility with appropriate resources  Outcome: Progressing     Problem: Safety - Medical Restraint  Goal: Remains free of injury from restraints (Restraint for Interference with Medical Device)  Description: INTERVENTIONS:  1. Determine that other, less restrictive measures have been tried or would not be effective before applying the restraint  2. Evaluate the patient's condition at the time of restraint application  3. Inform patient/family regarding the reason for restraint  4. Q2H: Monitor safety, psychosocial status, comfort, nutrition and hydration  Outcome: Progressing     Problem: Pain  Goal: Verbalizes/displays adequate comfort level or baseline comfort level  Outcome: Progressing     Problem: Safety - Adult  Goal: Free from fall injury  Outcome: Progressing

## 2024-11-18 LAB
ANION GAP SERPL CALCULATED.3IONS-SCNC: 14 MMOL/L (ref 3–16)
BASOPHILS # BLD: 0.2 K/UL (ref 0–0.2)
BASOPHILS NFR BLD: 2.8 %
BUN SERPL-MCNC: 12 MG/DL (ref 7–20)
CALCIUM SERPL-MCNC: 9.6 MG/DL (ref 8.3–10.6)
CHLORIDE SERPL-SCNC: 100 MMOL/L (ref 99–110)
CO2 SERPL-SCNC: 23 MMOL/L (ref 21–32)
CREAT SERPL-MCNC: 0.6 MG/DL (ref 0.9–1.3)
DEPRECATED RDW RBC AUTO: 14.6 % (ref 12.4–15.4)
EOSINOPHIL # BLD: 0.1 K/UL (ref 0–0.6)
EOSINOPHIL NFR BLD: 1.8 %
GFR SERPLBLD CREATININE-BSD FMLA CKD-EPI: >90 ML/MIN/{1.73_M2}
GLUCOSE SERPL-MCNC: 114 MG/DL (ref 70–99)
HCT VFR BLD AUTO: 37.8 % (ref 40.5–52.5)
HGB BLD-MCNC: 12.7 G/DL (ref 13.5–17.5)
LYMPHOCYTES # BLD: 1.2 K/UL (ref 1–5.1)
LYMPHOCYTES NFR BLD: 20.2 %
MCH RBC QN AUTO: 30.6 PG (ref 26–34)
MCHC RBC AUTO-ENTMCNC: 33.5 G/DL (ref 31–36)
MCV RBC AUTO: 91.5 FL (ref 80–100)
MONOCYTES # BLD: 0.8 K/UL (ref 0–1.3)
MONOCYTES NFR BLD: 13.2 %
NEUTROPHILS # BLD: 3.6 K/UL (ref 1.7–7.7)
NEUTROPHILS NFR BLD: 62 %
PLATELET # BLD AUTO: 328 K/UL (ref 135–450)
PMV BLD AUTO: 8.1 FL (ref 5–10.5)
POTASSIUM SERPL-SCNC: 4 MMOL/L (ref 3.5–5.1)
RBC # BLD AUTO: 4.14 M/UL (ref 4.2–5.9)
SODIUM SERPL-SCNC: 137 MMOL/L (ref 136–145)
WBC # BLD AUTO: 5.9 K/UL (ref 4–11)

## 2024-11-18 PROCEDURE — 97530 THERAPEUTIC ACTIVITIES: CPT

## 2024-11-18 PROCEDURE — 97116 GAIT TRAINING THERAPY: CPT

## 2024-11-18 PROCEDURE — 85025 COMPLETE CBC W/AUTO DIFF WBC: CPT

## 2024-11-18 PROCEDURE — 97130 THER IVNTJ EA ADDL 15 MIN: CPT

## 2024-11-18 PROCEDURE — 97110 THERAPEUTIC EXERCISES: CPT

## 2024-11-18 PROCEDURE — 6360000002 HC RX W HCPCS: Performed by: STUDENT IN AN ORGANIZED HEALTH CARE EDUCATION/TRAINING PROGRAM

## 2024-11-18 PROCEDURE — 99223 1ST HOSP IP/OBS HIGH 75: CPT | Performed by: PSYCHIATRY & NEUROLOGY

## 2024-11-18 PROCEDURE — 6370000000 HC RX 637 (ALT 250 FOR IP): Performed by: STUDENT IN AN ORGANIZED HEALTH CARE EDUCATION/TRAINING PROGRAM

## 2024-11-18 PROCEDURE — 80048 BASIC METABOLIC PNL TOTAL CA: CPT

## 2024-11-18 PROCEDURE — 36415 COLL VENOUS BLD VENIPUNCTURE: CPT

## 2024-11-18 PROCEDURE — 97112 NEUROMUSCULAR REEDUCATION: CPT

## 2024-11-18 PROCEDURE — 97535 SELF CARE MNGMENT TRAINING: CPT

## 2024-11-18 PROCEDURE — 1280000000 HC REHAB R&B

## 2024-11-18 PROCEDURE — 97129 THER IVNTJ 1ST 15 MIN: CPT

## 2024-11-18 RX ADMIN — PREGABALIN 75 MG: 75 CAPSULE ORAL at 21:42

## 2024-11-18 RX ADMIN — Medication 100 MG: at 08:19

## 2024-11-18 RX ADMIN — PREGABALIN 75 MG: 75 CAPSULE ORAL at 08:19

## 2024-11-18 RX ADMIN — Medication 1 TABLET: at 08:19

## 2024-11-18 RX ADMIN — FOLIC ACID 1 MG: 1 TABLET ORAL at 08:19

## 2024-11-18 RX ADMIN — ENOXAPARIN SODIUM 40 MG: 100 INJECTION SUBCUTANEOUS at 08:19

## 2024-11-18 ASSESSMENT — PAIN SCALES - GENERAL: PAINLEVEL_OUTOF10: 0

## 2024-11-18 NOTE — CARE COORDINATION
Case Management ARU Admission Assessment   [facility]    Patient:Fernando Abdullahi     Rehab Dx/Hx: Seizure (HCC) [R56.9]   :1995  MRN:[id number]  Date of Admit: 11/15/2024  Room #: 0154/0154-01       Objective:  met with the patient to complete initial assessment and review the role of Case Management while on the ARU. Patient educated on team conferences. Discussed family training with the patient/family on how it is encouraged on the unit. Order for \"discharge planning\" has been addressed.          Family Present: none   Primary : Mother Viktoriya Abdullahi [phone]   Health Care Decision Maker:    Current PCP:  Slim Kamara MD (patient thinks this is his pediatrician)- patient reports mother is actively working to establish a new PCP       Admit date:  11/15/2024   Insurance: [place] required for SNF:  [] No       [x] Yes   3 Night stay required: [x] No       [] Yes   Admitting diagnosis: Seizure (HCC) [R56.9]       Current home situation: Lives in [place] alone in 1st floor apartment w/ 0STE.    DME at home: [] Walker     [] Cane       [] RTS        [] BSC      [] Shower Chair        [] O2       [] HHN     [] CPAP       [] BiPap      [] Hospital Bed       [] W/C        [x] Other: None   Medical concerns requiring training: Continue to assess   Medication concerns:  Require financial assistance with meds? [x] No       [] If yes, please explain:   [] Yes              Services prior to admission: none   Preference for HHC or OP Therapy: [] Home health       [x] Outpatient       [] No preference  Will be discharging home to [place]   Patient's goal(s): Improve strength and balance   Working or volunteering PTA? working       Transportation needs: Active  prior to hospitalization. Parents to provide transportation support after discharge. Parent will plan to drive patient back to [place] at discharge- no barriers reported   Has lack of transportation kept

## 2024-11-18 NOTE — PLAN OF CARE
Problem: Safety - Adult  Goal: Free from fall injury  11/18/2024 0906 by Torsten Reynolds, RN  Outcome: Progressing  11/17/2024 2252 by Katelyn Molina, RN  Outcome: Progressing

## 2024-11-18 NOTE — PROGRESS NOTES
Fernando Abdullahi  11/18/2024  [id number]    Chief Complaint: Seizure (HCC)    Subjective:   No acute events overnight. Today Fernando is seen with therapy in the gym. He reports that he is feeling well and denies acute complaints at this time. He endorses sleeping ok and denies feeling anxious.     Personally reviewed labs.     ROS: denies f/c, n/v, cp, sob    Objective:  Patient Vitals for the past 24 hrs:   BP Temp Temp src Pulse Resp SpO2 Weight   11/18/24 0905 114/77 -- -- 65 -- 98 % --   11/18/24 0815 110/79 97.8 °F (36.6 °C) Oral 77 18 95 % --   11/18/24 0530 -- -- -- -- -- -- 65.2 kg (143 lb 12.8 oz)   11/17/24 2030 120/79 98.3 °F (36.8 °C) Oral 84 16 96 % --     Gen: No distress, pleasant.   HEENT: Normocephalic, atraumatic.   CV: extremities well perfused  Resp: No respiratory distress. On room air   Abd: Soft, nontender   Ext: No edema.   Neuro: Alert, oriented, appropriately interactive.  Psych: appropriate mood and affect     Wt Readings from Last 3 Encounters:   11/18/24 65.2 kg (143 lb 12.8 oz)   11/15/24 67.7 kg (149 lb 3.2 oz)       Laboratory data:   Lab Results   Component Value Date    WBC 5.9 11/18/2024    HGB 12.7 (L) 11/18/2024    HCT 37.8 (L) 11/18/2024    MCV 91.5 11/18/2024     11/18/2024       Lab Results   Component Value Date/Time     11/18/2024 05:39 AM    K 4.0 11/18/2024 05:39 AM     11/18/2024 05:39 AM    CO2 23 11/18/2024 05:39 AM    BUN 12 11/18/2024 05:39 AM    CREATININE 0.6 11/18/2024 05:39 AM    GLUCOSE 114 11/18/2024 05:39 AM    CALCIUM 9.6 11/18/2024 05:39 AM        Therapy progress:  Physical therapy:  Bed Mobility:     Sit>supine:     Supine>sit:     Transfers:     Sit>stand:     Stand>sit:     Bed<>chair     Stand Pivot:     Lateral transfer:     Car transfer:     Ambulation:     Stairs:     Curb:     Wheelchair:       Occupational therapy:   Feeding     Grooming/Oral Hygiene  Assistance Level: Stand by assist  Skilled Clinical Factors: standing at sink to brush

## 2024-11-18 NOTE — PLAN OF CARE
Problem: Discharge Planning  Goal: Discharge to home or other facility with appropriate resources  Outcome: Progressing     Problem: Pain  Goal: Verbalizes/displays adequate comfort level or baseline comfort level  Outcome: Progressing     Problem: Safety - Adult  Goal: Free from fall injury  Outcome: Progressing     Problem: Safety - Medical Restraint  Goal: Remains free of injury from restraints (Restraint for Interference with Medical Device)  Description: INTERVENTIONS:  1. Determine that other, less restrictive measures have been tried or would not be effective before applying the restraint  2. Evaluate the patient's condition at the time of restraint application  3. Inform patient/family regarding the reason for restraint  4. Q2H: Monitor safety, psychosocial status, comfort, nutrition and hydration  Outcome: HH/HSPC Resolved Met

## 2024-11-18 NOTE — PROGRESS NOTES
Physical Therapy  Facility/Department: [facility]  Rehabilitation Physical Therapy Treatment Note    NAME: Fernando Abdullahi  : 1995 (29 y.o.)  MRN: [id number]  CODE STATUS: Full Code    Date of Service: 24       Restrictions:  Restrictions/Precautions: Up as Tolerated, Seizure, Fall Risk, General Precautions  Position Activity Restriction  Other position/activity restrictions: telesitter     SUBJECTIVE  Subjective  Subjective: Pt semi-supine in bed upon arrival, agreeable to PT tx  Pain: Denies pain    OBJECTIVE  Cognition  Overall Cognitive Status: Exceptions  Arousal/Alertness: Appears intact  Following Commands: Appears intact  Attention Span: Appears intact  Memory: Decreased short term memory  Safety Judgement: Decreased awareness of need for assistance  Problem Solving: Assistance required to identify errors made  Insights: Decreased awareness of deficits  Initiation: Appears intact  Sequencing: Requires cues for some  Orientation  Overall Orientation Status: Within Functional Limits  Orientation Level: Oriented X4    Functional Mobility  Sit to Supine  Assistance Level: Supervision  Supine to Sit  Assistance Level: Supervision  Balance  Sitting Balance: Supervision  Standing Balance: Contact guard assistance  Standing Balance  Activity: Standing Balance at BITS, see below  Transfers  Additional Factors: Verbal cues  Sit to Stand  Assistance Level: Stand by assist  Skilled Clinical Factors: multiple reps from various surface, occassionally impulsive  Stand to Sit  Assistance Level: Stand by assist    Environmental Mobility  Ambulation  Surface: Level surface  Distance: 100 ft + shorter distances within gym + community distance to [facility]  Additional Factors: Verbal cues  Assistance Level: Minimal assistance;Stand by assist  Gait Deviations: Slow rishi;Decreased step length bilateral;Path deviations;Narrow base of support;Unsteady gait;Decreased arm swing bilateral  Skilled Clinical Factors: Pt demo

## 2024-11-18 NOTE — PROGRESS NOTES
[facility]: ACUTE REHAB UNIT  SPEECH-LANGUAGE PATHOLOGY      [x] Daily  [] Weekly Care Conference Note  [] Discharge    Patient:Fernando Abdullahi      :1995  MRN:[id number]  Rehab Dx/Hx: Seizure (HCC) [R56.9]    Precautions: falls  Home situation: lives by self, manages own meds/finances/household tasks, working full time, active    ST Dx: [] Aphasia  [] Dysarthria  [] Apraxia   [] Oropharyngeal dysphagia [x] Cognitive Impairment  [] Other:   Date of Admit: 11/15/2024  Room #: 0154/0154-01    Current functional status (updated daily):         Pt being seen for : [] Speech/Language Treatment  [] Dysphagia Treatment [x] Cognitive Treatment  [] Other:  Communication: [x]WFL  [] Aphasia  [] Dysarthria  [] Apraxia  [] Pragmatic Impairment [] Non-verbal  [] Hearing Loss  [] Other:   Cognition: [] WFL  [x] Mild  [x] Moderate  [] Severe [] Unable to Assess  [] Other:  Memory: [] WFL  [x] Mild  [x] Moderate  [] Severe [] Unable to Assess  [] Other:  Behavior: [x] Alert  [x] Cooperative  [x]  Pleasant  [] Confused  [] Agitated  [] Uncooperative  [] Distractible [] Motivated  [] Self-Limiting [] Anxious  [] Other:  Endurance:  [x] Adequate for participation in SLP sessions  [] Reduced overall  [] Lethargic  [] Other:  Safety: [x] No concerns at this time  [] Reduced insight into deficits  []  Reduced safety awareness [] Not following call light procedures  [] Unable to Assess  [] Other:    Current Diet Order:ADULT DIET; Regular  ADULT ORAL NUTRITION SUPPLEMENT; Breakfast, Dinner; Standard High Calorie/High Protein Oral Supplement  Swallowing Precautions: upright for all intake, stay upright for at least 30 mins after intake, small bites/sips, oral care 2-3x/day to reduce adverse affects in the event of aspiration, increase physical mobility as able, alternate bites/sips, slow rate of intake, STRICT aspiration precautions, and hold PO and contact SLP if s/s of aspiration or worsening respiratory status develop.         Date:

## 2024-11-18 NOTE — PROGRESS NOTES
Occupational Therapy  Facility/Department: [facility]  Rehabilitation Occupational Therapy Daily Treatment Note    Date: 24  Patient Name: Fernando Abdullahi       Room: 0154/0154-01  MRN: [id number]  Account: [id number]   : 1995  (29 y.o.) Gender: male                    Past Medical History:  has no past medical history on file.  Past Surgical History:   has no past surgical history on file.    Restrictions  Restrictions/Precautions: Up as Tolerated, Seizure, Fall Risk, General Precautions  Other position/activity restrictions: telesitter    Subjective  Subjective: Pt in bed, pleasant, no pain, resting, easily arousable, agreeable to OT session, /77, HR 65, O2 sats 98% on RA.  Restrictions/Precautions: Up as Tolerated;Seizure;Fall Risk;General Precautions             Objective     Cognition  Overall Cognitive Status: Exceptions  Arousal/Alertness: Appears intact  Following Commands: Appears intact  Attention Span: Appears intact  Memory: Decreased short term memory  Safety Judgement: Decreased awareness of need for assistance  Problem Solving: Assistance required to identify errors made  Insights: Decreased awareness of deficits  Initiation: Appears intact  Sequencing: Requires cues for some  Orientation  Overall Orientation Status: Within Functional Limits         ADL  Grooming/Oral Hygiene  Assistance Level: Stand by assist  Skilled Clinical Factors: standing at sink to brush teeth  Toileting  Assistance Level: Supervision  Skilled Clinical Factors: standing to urinate at toilet          Functional Mobility  Device:  (no AD)  Activity: To/From bathroom;Retrieve items;Transport items;To/From therapy gym  Assistance Level: Moderate assistance  Skilled Clinical Factors: SBA/CGA for majority of mobility with frequent swaying/swerving, min A at times for correct of LOB or to avoid obstacles, min/mod A for balance standing on balance board, standing for 3 minutes in bathroom, 10 minutes for collection of  Time   Individual Concurrent Group Co-treatment   Time In 0900         Time Out 1000         Minutes 60         Timed Code Treatment Minutes: 60 Minutes       Aleksander Mendoza, OT

## 2024-11-18 NOTE — CONSULTS
Full note dictated.    Dx:  alcohol dependence    Rec:  spent some time discussing what his definition of alcohol dependence is and how he feels about himself.  He states most of his family are alcoholics and he would feel \"the odd man out\" if he didn't drink.  Not really even in a contemplative stage of recovery yet.  Discussed his labs and what they reveal.  Discussed options.  I will check back in on Wednesday after he has had some time to think.      João Benoit MD

## 2024-11-18 NOTE — PATIENT CARE CONFERENCE
[facility]  Inpatient Rehabilitation  Weekly Team Conference Note    Date: 2024  Patient Name: Fernando Abdullahi        MRN: [id number]    : 1995  (29 y.o.)  Gender: male   Referring Practitioner: Reva Gloria MD  Diagnosis: seizure      Interventions to be utilized toward barriers to discharge, per discipline:  NURSING  Nursing observed barriers to dc: Impulsivity, Decreased endurance, Lower extremity weakness, and new on-set of seizures (Hx alcohol dependence)  Nursing interventions: assist with ADL's, provide education regarding disease processes, medication management  Family Education: No  Fall Risk:  Yes    Stay with me?: Yes    PHYSICAL THERAPY  Physical therapy observed barriers to dc:    Baseline: IND, lives alone in apartment, works full time   Current level: Varying level of assist from SBA-mod(A), no AD,    Barriers to DC: impaired balance reactions, impaired safety awareness   Needs in order to achieve dc home/next level of care: Home 24hr supervision, OPPT, CTA for DME    Physical therapy interventions:   Current Treatment Recommendations: Strengthening, Balance training, Functional mobility training, Transfer training, ADL/Self-care training, IADL training, Endurance training, Gait training, Cognitive/Perceptual training, Stair training, Neuromuscular re-education, Return to work related activity, Home exercise program, Safety education & training, Patient/Caregiver education & training, Equipment evaluation, education, & procurement, Vestibular rehab    PT Goals:            Short Term Goals  Time Frame for Short Term Goals: 1 week ()  Short Term Goal 1: pt will demonstrate MOD IND for bed mobility.  Short Term Goal 2: pt will demonstrates SBA for functional transfers with LRAD.  Short Term Goal 3: pt will demonstrate SBA for ambulation up to 150' with LRAD without LOB.  Short Term Goal 4: pt will ascend and descend 12 steps with SBA and one HR.

## 2024-11-19 PROCEDURE — 6370000000 HC RX 637 (ALT 250 FOR IP): Performed by: STUDENT IN AN ORGANIZED HEALTH CARE EDUCATION/TRAINING PROGRAM

## 2024-11-19 PROCEDURE — 97535 SELF CARE MNGMENT TRAINING: CPT

## 2024-11-19 PROCEDURE — 97112 NEUROMUSCULAR REEDUCATION: CPT

## 2024-11-19 PROCEDURE — 1280000000 HC REHAB R&B

## 2024-11-19 PROCEDURE — 97129 THER IVNTJ 1ST 15 MIN: CPT

## 2024-11-19 PROCEDURE — 97110 THERAPEUTIC EXERCISES: CPT

## 2024-11-19 PROCEDURE — 97116 GAIT TRAINING THERAPY: CPT

## 2024-11-19 PROCEDURE — 6360000002 HC RX W HCPCS: Performed by: STUDENT IN AN ORGANIZED HEALTH CARE EDUCATION/TRAINING PROGRAM

## 2024-11-19 PROCEDURE — 97130 THER IVNTJ EA ADDL 15 MIN: CPT

## 2024-11-19 PROCEDURE — 97530 THERAPEUTIC ACTIVITIES: CPT

## 2024-11-19 RX ADMIN — PREGABALIN 75 MG: 75 CAPSULE ORAL at 20:06

## 2024-11-19 RX ADMIN — ENOXAPARIN SODIUM 40 MG: 100 INJECTION SUBCUTANEOUS at 08:18

## 2024-11-19 RX ADMIN — Medication 1 TABLET: at 08:18

## 2024-11-19 RX ADMIN — PREGABALIN 75 MG: 75 CAPSULE ORAL at 08:18

## 2024-11-19 RX ADMIN — FOLIC ACID 1 MG: 1 TABLET ORAL at 08:18

## 2024-11-19 RX ADMIN — Medication 100 MG: at 08:18

## 2024-11-19 ASSESSMENT — PAIN SCALES - GENERAL: PAINLEVEL_OUTOF10: 0

## 2024-11-19 NOTE — CARE COORDINATION
Weekly team care conference with interdisciplinary team on: 11/19/24    Chart reviewed for length of stay. IP day # 4  Unit: ARU   Diagnosis and current status as per MD progress: Seizure  Consultants Following: Physical Medicine, Psychiatry  Planned Discharge Date: 11/23/24  Durable medical equipment needed: shower chair  Discharge Plan: Home to [place] w/ support of family. OP therapy    Donn Matamoros RN

## 2024-11-19 NOTE — PROGRESS NOTES
ADDENDUM TO ARU PATIENT TREATMENT PLAN  [facility]    Patient Name: Fernando Abdullahi        MRN: [id number]    : 1995  (29 y.o.)  Date: 2024     The ARU Patient Treatment Plan for Fenrando Abdullahi has been updated as of 2024 to reflect the following changes:  Physical Therapy: Remaining at 60 minutes 5 days/week  Occupational Therapy: increasing to 90 minutes, from 60 minutes 5 days/week due to decreased SLP needs and continued OT deficits.  Speech Therapy: Reducing ST POC to 30 minutes 5 days/week due to pt's cognitive skills are very high functioning and completing tasks with % acc indep.     Patient, MD, and treatment team all aware and in agreement.      Signature:  Sabrina Meyer MA [facility] SLP   Aleksander Mendoza OTR/L  Cherelle Siegel, PT, DPT    Electronically signed by Reva Gloria MD on 2024 at 11:38 AM

## 2024-11-19 NOTE — CONSULTS
[place] [address] [place] [address]                              CONSULTATION      PATIENT NAME: FANG KING                  : 1995  MED REC NO: [id number]                      ROOM: 0154  ACCOUNT NO: [id number]                       ADMIT DATE: 11/15/2024  PROVIDER: João Benoit MD    PSYCHIATRY CONSULTATION    CONSULT DATE: 2024    REFERRING PHYSICIAN:  Dr. Reva Gloria      CHIEF COMPLAINT:  Alcohol abuse.    HISTORY OF PRESENT ILLNESS:  The patient is a 29-year-old who was initially referred to the hospital after he had a seizure at home and then subsequently had another 2 seizures while he was in the emergency department, which were believed to be a consequence of alcohol withdrawal and he was admitted to the ARU for functional deficits.  Because he continued to be encephalopathic once he was cleared from his alcohol abuse and Psychiatry was asked to evaluate.    The patient admits that he is a bourbon drinker but does not believe necessarily that he is alcohol dependent.  He believes the definition of dependence involves both frequency of the use as well as \"your body becoming dependent on it,\" but did not view his seizure as evidence of either of these problems.  He states that he does typically drink on Wednesday because he gets off work early on Wednesday and then every weekend, and states that the night before the seizure he had probably had a 5th of bourbon, which is more than he typically drinks, but he has a hard time quantifying exactly what a normal day's use is.  He is not sure he is supportive of his parents' desires to have him go to rehabilitation, stating that he has a job back at home in [place] and does not believe he wants to donate the time believing he can simply \"go to AA.\"    PAST PSYCHIATRIC HISTORY:  He has not been admitted to Psychiatry.  He does not have any history of suicide attempts.  He  is not currently taking psychiatric medication.    FAMILY PSYCHIATRIC HISTORY:  He reports significant for multiple members who have struggled with alcohol.  He denies any family history of successful suicide.    SOCIAL HISTORY:  The patient reports he grew up seeing his mother and father drink quite significantly.  He states his mother is \"mobile wine refrigerator,\" and his father constantly going to breweries drinking it.  Growing up is often neglected to do other things when they would visit up a city because his parents were alcohol focused.  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  He has no current legal problems pending, but he denies the use of any other drugs on any kind of consistent basis.    REVIEW OF SYSTEMS:  A 10-system review was completed.  The pertinent positives are as noted above.  The rest are negative.    PHYSICAL EXAMINATION:  VITAL SIGNS:  Temperature is 97.8, pulse 77, respirations 18, blood pressure is 110/79.  GENERAL APPEARANCE:  The patient appears to be a man who looks his stated age.  He is cooperative.  NEUROMUSCULAR:  He has normal muscle strength and tone.  His gait was not viewed, but I did review the physical therapy notes.    MENTAL STATUS EXAM:  The patient presents with no resting tremors or abnormal movements, tics, or mannerisms.  His thought processes are organized.  There is no indication of psychosis.  His mood appears to be slightly anxious at his current predicament.  They did not meet criteria for major depressive disorder.  He did not describe any panic attacks.  His affect was consistent with his mood.  He denies having any suicidal or homicidal

## 2024-11-19 NOTE — PROGRESS NOTES
Occupational Therapy  Facility/Department: [facility]  Rehabilitation Occupational Therapy Daily Treatment Note    Date: 24  Patient Name: Fernando Abdullahi       Room: 0154/0154-01  MRN: [id number]  Account: [id number]   : 1995  (29 y.o.) Gender: male                    Past Medical History:  has no past medical history on file.  Past Surgical History:   has no past surgical history on file.    Restrictions  Restrictions/Precautions: Up as Tolerated, Seizure, Fall Risk, General Precautions  Other position/activity restrictions: telesitter    Subjective  Subjective: Pt in bed, pleasant, no pain, agreeable to OT session, /77, HR 74, O2 sats 99% on RA.  Restrictions/Precautions: Up as Tolerated;Seizure;Fall Risk;General Precautions             Objective     Cognition  Overall Cognitive Status: Exceptions  Arousal/Alertness: Appears intact  Following Commands: Appears intact  Attention Span: Appears intact  Memory: Appears intact  Safety Judgement: Decreased awareness of need for safety  Problem Solving: Assistance required to identify errors made  Insights: Decreased awareness of deficits  Initiation: Appears intact  Sequencing: Appears intact  Orientation  Overall Orientation Status: Within Functional Limits         ADL  Grooming/Oral Hygiene  Assistance Level: Supervision  Skilled Clinical Factors: standing at sink to brush teeth  Upper Extremity Bathing  Assistance Level: Supervision  Skilled Clinical Factors: standing for entirety of task  Lower Extremity Bathing  Assistance Level: Supervision  Skilled Clinical Factors: standing for entirety of task  Upper Extremity Dressing  Assistance Level: Independent  Lower Extremity Dressing  Assistance Level: Supervision  Skilled Clinical Factors: in stance to doff/don underwear, seated to don pants  Putting On/Taking Off Footwear  Assistance Level: Modified independent  Tub/Shower Transfers  Type: Shower  Transfer From: Standing without device  Transfer To:

## 2024-11-19 NOTE — PROGRESS NOTES
Fernando Abdullahi  11/19/2024  [id number]    Chief Complaint: Seizure (HCC)    Subjective:   No acute events overnight. Today Fernando is seen in the gym with therapy. He reports that he is feeling well. He denies any symptoms of withdrawal. He endorses preferring to do outpatient alcohol rehabilitation on discharge.     ROS: denies f/c, n/v, cp, sob    Objective:  Patient Vitals for the past 24 hrs:   BP Temp Temp src Pulse Resp SpO2   11/19/24 0812 102/60 98.3 °F (36.8 °C) Oral 73 17 96 %   11/19/24 0002 -- -- -- -- 16 --   11/18/24 2130 125/79 98 °F (36.7 °C) Oral 98 -- 98 %     Gen: No distress, pleasant.   HEENT: Normocephalic, atraumatic.   CV: extremities well perfused  Resp: No respiratory distress. On room air   Abd: Soft, nondistended  Ext: No edema.   Neuro: Alert, oriented, appropriately interactive. Ambulates in gym without a device  Psych: appropriate mood and affect     Wt Readings from Last 3 Encounters:   11/18/24 65.2 kg (143 lb 12.8 oz)   11/15/24 67.7 kg (149 lb 3.2 oz)       Laboratory data:   Lab Results   Component Value Date    WBC 5.9 11/18/2024    HGB 12.7 (L) 11/18/2024    HCT 37.8 (L) 11/18/2024    MCV 91.5 11/18/2024     11/18/2024       Lab Results   Component Value Date/Time     11/18/2024 05:39 AM    K 4.0 11/18/2024 05:39 AM     11/18/2024 05:39 AM    CO2 23 11/18/2024 05:39 AM    BUN 12 11/18/2024 05:39 AM    CREATININE 0.6 11/18/2024 05:39 AM    GLUCOSE 114 11/18/2024 05:39 AM    CALCIUM 9.6 11/18/2024 05:39 AM        Therapy progress:  Physical therapy:  Bed Mobility:     Sit>supine:  Assistance Level: Supervision  Supine>sit:  Assistance Level: Supervision  Transfers:  Additional Factors: Verbal cues  Sit>stand:  Assistance Level: Stand by assist  Skilled Clinical Factors: multiple reps from various surface, occassionally impulsive  Stand>sit:  Assistance Level: Stand by assist  Bed<>chair     Stand Pivot:     Lateral transfer:     Car transfer:     Ambulation:  Surface:

## 2024-11-19 NOTE — PROGRESS NOTES
Physical Therapy  Facility/Department: [facility]  Rehabilitation Physical Therapy Treatment Note    NAME: Fernando Abdullahi  : 1995 (29 y.o.)  MRN: [id number]  CODE STATUS: Full Code    Date of Service: 24       Restrictions:  Restrictions/Precautions: Up as Tolerated, Seizure, Fall Risk, General Precautions  Position Activity Restriction  Other position/activity restrictions: telesitter     SUBJECTIVE  Subjective  Subjective: pt found sleeping in bed  Pain: denies pain     OBJECTIVE  Cognition  Overall Cognitive Status: Exceptions  Arousal/Alertness: Appears intact  Following Commands: Appears intact  Attention Span: Appears intact  Memory: Appears intact  Safety Judgement: Decreased awareness of need for safety  Problem Solving: Assistance required to identify errors made  Insights: Decreased awareness of deficits  Initiation: Appears intact  Sequencing: Appears intact  Orientation  Overall Orientation Status: Within Functional Limits  Orientation Level: Oriented X4    Functional Mobility  Sit to Supine  Assistance Level: Independent  Supine to Sit  Assistance Level: Independent  Standing Balance  Activity: pt utilized commode, standing to void with SBA and no LOB. tight turn to wash hands, minor LOb but pt able to catch self on countertop.  Sit to Stand  Assistance Level: Supervision;Stand by assist  Skilled Clinical Factors: from EOB, chair and low mat  Stand to Sit  Assistance Level: Stand by assist;Supervision      Environmental Mobility  Ambulation  Surface: Level surface  Device: Rolling walker (no AD)  Distance: 150 ft with RW (cGA-SBA with no overt LOB)+ 250 ft with no AD (collecting 9 cones from salcedo rail, floor and above head with 2 cues to scan for 2/9 cones, cues for wide MUSTAPHA, no overt LOB but mild unsteadiness)  Assistance Level: Contact guard assist;Stand by assist  Gait Deviations: Slow rishi;Decreased step length bilateral;Path deviations;Narrow base of support;Unsteady gait;Decreased arm  education needed;Verbalized understanding        Therapy Time   Individual Concurrent Group Co-treatment   Time In 1230         Time Out 1330         Minutes 60           Timed Code Treatment Minutes: 60 Minutes       Lori Martinez, PT, 11/19/24 at 3:14 PM

## 2024-11-19 NOTE — PROGRESS NOTES
[facility]: ACUTE REHAB UNIT  SPEECH-LANGUAGE PATHOLOGY      [x] Daily  [] Weekly Care Conference Note  [] Discharge    Patient:Fernando Abdullahi      :1995  MRN:[id number]  Rehab Dx/Hx: Seizure (HCC) [R56.9]    Precautions: falls  Home situation: lives by self, manages own meds/finances/household tasks, working full time, active    ST Dx: [] Aphasia  [] Dysarthria  [] Apraxia   [] Oropharyngeal dysphagia [x] Cognitive Impairment  [] Other:   Date of Admit: 11/15/2024  Room #: 0154/0154-01    Current functional status (updated daily):         Pt being seen for : [] Speech/Language Treatment  [] Dysphagia Treatment [x] Cognitive Treatment  [] Other:  Communication: [x]WFL  [] Aphasia  [] Dysarthria  [] Apraxia  [] Pragmatic Impairment [] Non-verbal  [] Hearing Loss  [] Other:   Cognition: [] WFL  [x] Mild  [x] Moderate  [] Severe [] Unable to Assess  [] Other:  Memory: [] WFL  [x] Mild  [x] Moderate  [] Severe [] Unable to Assess  [] Other:  Behavior: [x] Alert  [x] Cooperative  [x]  Pleasant  [] Confused  [] Agitated  [] Uncooperative  [] Distractible [] Motivated  [] Self-Limiting [] Anxious  [] Other:  Endurance:  [x] Adequate for participation in SLP sessions  [] Reduced overall  [] Lethargic  [] Other:  Safety: [x] No concerns at this time  [] Reduced insight into deficits  []  Reduced safety awareness [] Not following call light procedures  [] Unable to Assess  [] Other:    Current Diet Order:ADULT DIET; Regular  ADULT ORAL NUTRITION SUPPLEMENT; Breakfast, Dinner; Standard High Calorie/High Protein Oral Supplement  Swallowing Precautions: upright for all intake, stay upright for at least 30 mins after intake, small bites/sips, oral care 2-3x/day to reduce adverse affects in the event of aspiration, increase physical mobility as able, alternate bites/sips, slow rate of intake, STRICT aspiration precautions, and hold PO and contact SLP if s/s of aspiration or worsening respiratory status develop.         Date:

## 2024-11-19 NOTE — CARE COORDINATION
CM update: Spoke with patient in room to discuss discharge planning. Patient informed of discharge planned for Saturday 11/23 and a recommendation for continued OP therapy after discharge. Patient understands that a shower chair has been recommended for him after discharge and that I will evaluate insurance coverage and provide patient with at discharge if able. Patient reports that his parents will be coming in from [place] on Thursday. He also reports that he is able to stay with his aunts who live nearby at discharge if needed. His parents will plan to provide transportation back to [place]. Permission given from patient for me to contact his mother and inform her of discharge recommendation. No questions/concerns from patient at this time. Will continue to follow.    Called and spoke with patient's mother Viktoriya over the phone. She was made aware of discharge planned for Saturday 11/23 and that we are recommending OP therapy after discharge. Mother Viktoriya informs me that the patient's family is working to have him admitted to an inpatient residential recovery program for his alcohol abuse -set up for him after discharge, near [place]. She reports she will be driving in from [place] and will be here on Thursday. She states she will ask the residential facility if they are able to accommodate the recommended OP therapy appointments. She states she continues to speak with patient to get him to agree and commit to going to the residential treatment program. No further questions/concerns at this time. Will continue to follow.     Donn Matamoros RN

## 2024-11-19 NOTE — PLAN OF CARE
Patient remains free from falls and injuries thus far.  Call light within reach.  Patient has been compliant with asking staff to help with transfers.  Non skid footwear is on at all times, and there is a avasys in room for safety. Cade Austin RN

## 2024-11-20 PROCEDURE — 97535 SELF CARE MNGMENT TRAINING: CPT

## 2024-11-20 PROCEDURE — 6370000000 HC RX 637 (ALT 250 FOR IP): Performed by: STUDENT IN AN ORGANIZED HEALTH CARE EDUCATION/TRAINING PROGRAM

## 2024-11-20 PROCEDURE — 97530 THERAPEUTIC ACTIVITIES: CPT

## 2024-11-20 PROCEDURE — 97110 THERAPEUTIC EXERCISES: CPT

## 2024-11-20 PROCEDURE — 99232 SBSQ HOSP IP/OBS MODERATE 35: CPT | Performed by: PSYCHIATRY & NEUROLOGY

## 2024-11-20 PROCEDURE — 6360000002 HC RX W HCPCS: Performed by: STUDENT IN AN ORGANIZED HEALTH CARE EDUCATION/TRAINING PROGRAM

## 2024-11-20 PROCEDURE — 97116 GAIT TRAINING THERAPY: CPT

## 2024-11-20 PROCEDURE — 1280000000 HC REHAB R&B

## 2024-11-20 PROCEDURE — 97129 THER IVNTJ 1ST 15 MIN: CPT

## 2024-11-20 PROCEDURE — 97130 THER IVNTJ EA ADDL 15 MIN: CPT

## 2024-11-20 PROCEDURE — 97112 NEUROMUSCULAR REEDUCATION: CPT

## 2024-11-20 RX ADMIN — FOLIC ACID 1 MG: 1 TABLET ORAL at 09:02

## 2024-11-20 RX ADMIN — ENOXAPARIN SODIUM 40 MG: 100 INJECTION SUBCUTANEOUS at 09:02

## 2024-11-20 RX ADMIN — PREGABALIN 75 MG: 75 CAPSULE ORAL at 09:02

## 2024-11-20 RX ADMIN — Medication 1 TABLET: at 09:02

## 2024-11-20 RX ADMIN — Medication 100 MG: at 09:02

## 2024-11-20 RX ADMIN — PREGABALIN 75 MG: 75 CAPSULE ORAL at 20:57

## 2024-11-20 ASSESSMENT — PAIN SCALES - GENERAL: PAINLEVEL_OUTOF10: 0

## 2024-11-20 NOTE — PROGRESS NOTES
Medications: acetaminophen (TYLENOL) tablet 650 mg, 650 mg, Oral, Q6H PRN **OR** [DISCONTINUED] acetaminophen (TYLENOL) suppository 650 mg, 650 mg, Rectal, Q6H PRN  enoxaparin (LOVENOX) injection 40 mg, 40 mg, SubCUTAneous, Daily  folic acid (FOLVITE) tablet 1 mg, 1 mg, Oral, Daily  nicotine (NICODERM CQ) 21 MG/24HR 1 patch, 1 patch, TransDERmal, Daily  ondansetron (ZOFRAN-ODT) disintegrating tablet 4 mg, 4 mg, Oral, Q8H PRN  polyethylene glycol (GLYCOLAX) packet 17 g, 17 g, Oral, Daily PRN  pregabalin (LYRICA) capsule 75 mg, 75 mg, Oral, BID  thiamine tablet 100 mg, 100 mg, Oral, Daily  therapeutic multivitamin-minerals 1 tablet, 1 tablet, Oral, Daily  bisacodyl (DULCOLAX) suppository 10 mg, 10 mg, Rectal, Daily PRN  melatonin disintegrating tablet 5 mg, 5 mg, Oral, Nightly PRN    ASSESSMENT AND PLAN    PRIMARY PSYCH DIAGNOSIS: alcohol dependence    Principal Problem:    Seizure (HCC)  Resolved Problems:    * No resolved hospital problems. *       1.Patient s symptoms better.  I encouraged him to think about the social situations when he had been drinking and how he will approach these.  Discussed setting up out patient treatment.  He can be discharged when medically stable  2.Probable discharge per team   3.Discharge planning is per team  4 Suicidal ideation is denies suicidal ideation  5 total time 40 minutes more than 50% was spent in direct time with the patient    João Benoit MD

## 2024-11-20 NOTE — PROGRESS NOTES
SBA for dynamic amb and dual task completion. Pt demo'd good ability to complete dual task while sitting and standing. Pt amb <> hospital cafe with SPV-SBA for balance. Continue OT POC.    Second Session:   Patient tolerated second session well with no complaints of pain. Patient completed a bed making task with supervision; one LOB noted with ability to regain without physical assistance required.  Patient required minimal problem solving/recall cues throughout the tx session.  To challenge dynamic standing balance, FMC, and visual motor/perception skills patient participated in a small shapes puzzle while standing on a balance board.  Patient required extra time and effort to complete.  No overt LOB noted in standing.    Supervision provided for ambulation to/from the therapy gym. Mod I noted for bed mobility. Patient left in bed with alarm activated and all needs within reach. Continue OT POC.    Activity Tolerance: Patient tolerated treatment well  Discharge Recommendations: 24 hour supervision or assist;Outpatient OT  OT Equipment Recommendations  Equipment Needed: Yes  Mobility Devices: ADL Assistive Devices  ADL Assistive Devices: Shower Chair without back  Safety Devices  Safety Devices in place: Yes  Type of devices: Patient at risk for falls;Gait belt;Call light within reach;Nurse notified;Bed alarm in place;Left in bed  Restraints  Initially in place: No    Patient Education  Education  Education Given To: Patient  Education Provided: Role of Therapy;Plan of Care;Precautions;Safety;ADL Function;DME/Home Modifications;Equipment;Transfer Training;Mobility Training;IADL Function;Fall Prevention Strategies  Education Provided Comments: role of OT, transfers training, safety with transfers and ADLs,  balance while seated and standing, dual tasking  Education Method: Verbal;Demonstration  Barriers to Learning: Cognition  Education Outcome: Verbalized understanding;Demonstrated understanding;Continued education  2 grooming tasks while standing Mod I.                   Therapy Time   Individual Concurrent Group Co-treatment   Time In 0730         Time Out 0830         Minutes 60         Timed Code Treatment Minutes: 60 Minutes      11/20/24    OT Individual Minutes   Time In 1230   Time Out 1300   Minutes 30   Time Code Minutes    Timed Code Treatment Minutes 30 Minutes     Total OT treatment time 90 minutes    Second Session: Sharon Muir OTR/JOÃO Chavez, OT

## 2024-11-20 NOTE — PROGRESS NOTES
Fernando Abdullahi  11/20/2024  [id number]    Chief Complaint: Seizure (HCC)    Subjective:   No acute events overnight. Today Fernando is seen in his room, resting in bed. He reports feeling well. Discussed lyrica and need to follow up with a family doctor to continue this medication. Discussed anticipated discharge date of Saturday.     ROS: denies f/c, n/v, cp, sob    Objective:  Patient Vitals for the past 24 hrs:   BP Temp Temp src Pulse Resp SpO2   11/20/24 1050 104/72 -- -- 87 -- 98 %   11/20/24 0900 130/74 98.2 °F (36.8 °C) Oral 98 -- 96 %   11/20/24 0742 (!) 134/91 -- -- 94 -- 93 %   11/19/24 2330 118/75 98.3 °F (36.8 °C) Oral 87 16 99 %   11/19/24 1304 119/73 -- -- 98 -- 98 %     Gen: No distress, pleasant. Supine in bed  HEENT: Normocephalic, atraumatic.   CV: extremities well perfused  Resp: No respiratory distress. On room air   Abd: Soft, nondistended  Ext: No edema.   Neuro: Alert, oriented, appropriately interactive. Speech fluent  Psych: appropriate mood and affect     Wt Readings from Last 3 Encounters:   11/18/24 65.2 kg (143 lb 12.8 oz)   11/15/24 67.7 kg (149 lb 3.2 oz)       Laboratory data:   Lab Results   Component Value Date    WBC 5.9 11/18/2024    HGB 12.7 (L) 11/18/2024    HCT 37.8 (L) 11/18/2024    MCV 91.5 11/18/2024     11/18/2024       Lab Results   Component Value Date/Time     11/18/2024 05:39 AM    K 4.0 11/18/2024 05:39 AM     11/18/2024 05:39 AM    CO2 23 11/18/2024 05:39 AM    BUN 12 11/18/2024 05:39 AM    CREATININE 0.6 11/18/2024 05:39 AM    GLUCOSE 114 11/18/2024 05:39 AM    CALCIUM 9.6 11/18/2024 05:39 AM        Therapy progress:  Physical therapy:  Bed Mobility:     Sit>supine:  Assistance Level: Independent  Supine>sit:  Assistance Level: Independent  Transfers:  Additional Factors: Verbal cues  Sit>stand:  Assistance Level: Supervision  Skilled Clinical Factors: from EOB, chair and low mat  Stand>sit:  Assistance Level: Supervision  Bed<>chair     Stand Pivot:      Lateral transfer:     Car transfer:     Ambulation:  Surface: Level surface  Device:  (no AD)  Distance: >400 ft+200 ft  Additional Factors: Verbal cues  Assistance Level: Supervision  Gait Deviations: Slow rishi, Decreased step length bilateral, Path deviations, Narrow base of support, Unsteady gait, Decreased arm swing bilateral  Skilled Clinical Factors: improved midline posture/linear paths  Stairs:  Stair Height: 6''  Device: One handrail  Number of Stairs: 12  Assistance Level: Supervision  Skilled Clinical Factors: reciprocal pattern  Curb:     Wheelchair:       Occupational therapy:   Feeding     Grooming/Oral Hygiene  Assistance Level: Modified independent  Skilled Clinical Factors: standing at sink to brush teeth  UE Bathing  Assistance Level: Independent  Skilled Clinical Factors: standing for entirety of task  LE Bathing  Assistance Level: Independent  Skilled Clinical Factors: standing for entirety of task  UE Dressing  Assistance Level: Independent  Skilled Clinical Factors: don/doff shirt  LE Dressing  Assistance Level: Independent  Skilled Clinical Factors: in stance to doff/don underwear, seated to don pants  Putting On/Taking Off Footwear  Assistance Level: Modified independent  Skilled Clinical Factors: don bilat socks  Toileting  Assistance Level: Supervision  Skilled Clinical Factors: standing to urinate at toilet    Speech therapy:    ADULT DIET; Regular  ADULT ORAL NUTRITION SUPPLEMENT; Breakfast, Dinner; Standard High Calorie/High Protein Oral Supplement    Body mass index is 18.97 kg/m².    Assessment and Plan:  Fernando Abdullahi is a 29 year old male with a past medical history significant for alcohol abuse who presented to [facility] on 11/8/24 after having a seizure at home, suspected to be due to alcohol withdrawal. He was admitted to [facility] on 11/15/24 due to functional deficits below his baseline.     Alcohol Withdrawal Seizures  - Lyrica per Neurology  - completed librium wean,

## 2024-11-20 NOTE — PROGRESS NOTES
backwards walking while completing ball dribbling or ball bounce/catch with no AD, grossly SBA. pt demos minor LOB however able to catch self with appropriate righting reactions. completed x 250 ft total      PT Exercises  Exercise Treatment: pt dons 3# weights BLe, completed 20 reps of BLE supine alternating heel slides, side-lying hip abduction, supine SLR, bridge with alternating LE extension, deadbug with blue SB  Circulation/Endurance Exercises: pt completed 10 min on SCIFIT level 3 with BUE and BLE for increased CV endurance and LE strength, completed peak activity.      ASSESSMENT/PROGRESS TOWARDS GOALS  Vital Signs  Pulse: 87  Heart Rate Source: Monitor  BP: 104/72  BP Location: Left upper arm  MAP (Calculated): 83  SpO2: 98 %  O2 Device: None (Room air)    Assessment  Assessment: pt progressing well towards therapy goals. completes transfers wiht supv-ind, gait with no AD with supv > 250 ft with improvied gait mechanics. pt demos improved righting reactions with balance challenges. continue to rec home with initial 24/7 and OPPT. DME: none  Activity Tolerance: Patient tolerated treatment well  Discharge Recommendations: 24 hour supervision or assist  PT Equipment Recommendations  Other: CTA for needs pending progress.  at this time anticipate RW, but pt may continue to progress to the point he does not need AD at end of treatment, will continue to assess.    Goals  Patient Goals   Patient Goals : \"I want to get back to my normal life.\"  Short Term Goals  Time Frame for Short Term Goals: 1 week (11/22)  Short Term Goal 1: pt will demonstrate MOD IND for bed mobility.- GOAL MET 11/20  Short Term Goal 2: pt will demonstrates SBA for functional transfers with LRAD.- GOAL MET 11/20  Short Term Goal 3: pt will demonstrate SBA for ambulation up to 150' with LRAD without LOB.- GOAL MET 11/20  Short Term Goal 4: pt will ascend and descend 12 steps with SBA and one HR.- GOAL MET 11/20  Long Term Goals  Time Frame for  Long Term Goals : 2 weeks (11/29)  Long Term Goal 1: pt will demonstrate IND with bed mobility.  Long Term Goal 2: pt will demonstrate IND with transfers with VS without AD.  Long Term Goal 3: pt will demonstrate IND with ambulation up to 300' with VS without AD.  Long Term Goal 4: pt will demonstrate IND to ascend and descend 12 steps without HR.    PLAN OF CARE/SAFETY  Physical Therapy Plan  General Plan: 5-7 times per week  Current Treatment Recommendations: Strengthening;Balance training;Functional mobility training;Transfer training;ADL/Self-care training;IADL training;Endurance training;Gait training;Cognitive/Perceptual training;Stair training;Neuromuscular re-education;Return to work related activity;Home exercise program;Safety education & training;Patient/Caregiver education & training;Equipment evaluation, education, & procurement;Vestibular rehab  Safety Devices  Type of Devices: Gait belt;Call light within reach;Left in chair;Patient at risk for falls;Bed alarm in place;Left in bed    EDUCATION  Education  Education Given To: Patient  Education Provided: ADL Function;Role of Therapy;Plan of Care;Mobility Training;Transfer Training;Safety  Education Provided Comments: importance of calling before getting up, expectations of ARU/shcedule, safety in ARU, benefits of time spent up in chair.  Education Method: Verbal  Barriers to Learning: Cognition  Education Outcome: Continued education needed;Verbalized understanding        Therapy Time   Individual Concurrent Group Co-treatment   Time In 1030         Time Out 1130         Minutes 60           Timed Code Treatment Minutes: 60 Minutes       Lori Martinez, PT, 11/20/24 at 11:17 AM

## 2024-11-20 NOTE — PROGRESS NOTES
[facility]: ACUTE REHAB UNIT  SPEECH-LANGUAGE PATHOLOGY      [x] Daily  [] Weekly Care Conference Note  [] Discharge    Patient:Fernando Abdullahi      :1995  MRN:[id number]  Rehab Dx/Hx: Seizure (HCC) [R56.9]    Precautions: falls  Home situation: lives by self, manages own meds/finances/household tasks, working full time, active    ST Dx: [] Aphasia  [] Dysarthria  [] Apraxia   [] Oropharyngeal dysphagia [x] Cognitive Impairment  [] Other:   Date of Admit: 11/15/2024  Room #: 0154/0154-01    Current functional status (updated daily):         Pt being seen for : [] Speech/Language Treatment  [] Dysphagia Treatment [x] Cognitive Treatment  [] Other:  Communication: [x]WFL  [] Aphasia  [] Dysarthria  [] Apraxia  [] Pragmatic Impairment [] Non-verbal  [] Hearing Loss  [] Other:   Cognition: [] WFL  [x] Mild  [x] Moderate  [] Severe [] Unable to Assess  [] Other:  Memory: [] WFL  [x] Mild  [x] Moderate  [] Severe [] Unable to Assess  [] Other:  Behavior: [x] Alert  [x] Cooperative  [x]  Pleasant  [] Confused  [] Agitated  [] Uncooperative  [] Distractible [] Motivated  [] Self-Limiting [] Anxious  [] Other:  Endurance:  [x] Adequate for participation in SLP sessions  [] Reduced overall  [] Lethargic  [] Other:  Safety: [x] No concerns at this time  [] Reduced insight into deficits  []  Reduced safety awareness [] Not following call light procedures  [] Unable to Assess  [] Other:    Current Diet Order:ADULT DIET; Regular  ADULT ORAL NUTRITION SUPPLEMENT; Breakfast, Dinner; Standard High Calorie/High Protein Oral Supplement  Swallowing Precautions: upright for all intake, stay upright for at least 30 mins after intake, small bites/sips, oral care 2-3x/day to reduce adverse affects in the event of aspiration, increase physical mobility as able, alternate bites/sips, slow rate of intake, STRICT aspiration precautions, and hold PO and contact SLP if s/s of aspiration or worsening respiratory status develop.         Date:

## 2024-11-21 LAB
ANION GAP SERPL CALCULATED.3IONS-SCNC: 13 MMOL/L (ref 3–16)
BASOPHILS # BLD: 0.2 K/UL (ref 0–0.2)
BASOPHILS NFR BLD: 2.3 %
BUN SERPL-MCNC: 13 MG/DL (ref 7–20)
CALCIUM SERPL-MCNC: 9.8 MG/DL (ref 8.3–10.6)
CHLORIDE SERPL-SCNC: 102 MMOL/L (ref 99–110)
CO2 SERPL-SCNC: 24 MMOL/L (ref 21–32)
CREAT SERPL-MCNC: 0.6 MG/DL (ref 0.9–1.3)
DEPRECATED RDW RBC AUTO: 15 % (ref 12.4–15.4)
EOSINOPHIL # BLD: 0.1 K/UL (ref 0–0.6)
EOSINOPHIL NFR BLD: 1.3 %
GFR SERPLBLD CREATININE-BSD FMLA CKD-EPI: >90 ML/MIN/{1.73_M2}
GLUCOSE SERPL-MCNC: 91 MG/DL (ref 70–99)
HCT VFR BLD AUTO: 38.7 % (ref 40.5–52.5)
HGB BLD-MCNC: 12.9 G/DL (ref 13.5–17.5)
LYMPHOCYTES # BLD: 1.4 K/UL (ref 1–5.1)
LYMPHOCYTES NFR BLD: 20.9 %
MCH RBC QN AUTO: 30.5 PG (ref 26–34)
MCHC RBC AUTO-ENTMCNC: 33.3 G/DL (ref 31–36)
MCV RBC AUTO: 91.7 FL (ref 80–100)
MONOCYTES # BLD: 0.8 K/UL (ref 0–1.3)
MONOCYTES NFR BLD: 11.9 %
NEUTROPHILS # BLD: 4.3 K/UL (ref 1.7–7.7)
NEUTROPHILS NFR BLD: 63.6 %
PLATELET # BLD AUTO: 333 K/UL (ref 135–450)
PMV BLD AUTO: 8.6 FL (ref 5–10.5)
POTASSIUM SERPL-SCNC: 4 MMOL/L (ref 3.5–5.1)
RBC # BLD AUTO: 4.22 M/UL (ref 4.2–5.9)
SODIUM SERPL-SCNC: 139 MMOL/L (ref 136–145)
WBC # BLD AUTO: 6.8 K/UL (ref 4–11)

## 2024-11-21 PROCEDURE — 97129 THER IVNTJ 1ST 15 MIN: CPT

## 2024-11-21 PROCEDURE — 85025 COMPLETE CBC W/AUTO DIFF WBC: CPT

## 2024-11-21 PROCEDURE — 80048 BASIC METABOLIC PNL TOTAL CA: CPT

## 2024-11-21 PROCEDURE — 36415 COLL VENOUS BLD VENIPUNCTURE: CPT

## 2024-11-21 PROCEDURE — 97530 THERAPEUTIC ACTIVITIES: CPT

## 2024-11-21 PROCEDURE — 97110 THERAPEUTIC EXERCISES: CPT

## 2024-11-21 PROCEDURE — 97130 THER IVNTJ EA ADDL 15 MIN: CPT

## 2024-11-21 PROCEDURE — 97112 NEUROMUSCULAR REEDUCATION: CPT

## 2024-11-21 PROCEDURE — 6370000000 HC RX 637 (ALT 250 FOR IP): Performed by: STUDENT IN AN ORGANIZED HEALTH CARE EDUCATION/TRAINING PROGRAM

## 2024-11-21 PROCEDURE — 97535 SELF CARE MNGMENT TRAINING: CPT

## 2024-11-21 PROCEDURE — 1280000000 HC REHAB R&B

## 2024-11-21 PROCEDURE — 97116 GAIT TRAINING THERAPY: CPT

## 2024-11-21 PROCEDURE — 6360000002 HC RX W HCPCS: Performed by: STUDENT IN AN ORGANIZED HEALTH CARE EDUCATION/TRAINING PROGRAM

## 2024-11-21 RX ADMIN — Medication 1 TABLET: at 08:16

## 2024-11-21 RX ADMIN — Medication 100 MG: at 08:16

## 2024-11-21 RX ADMIN — PREGABALIN 75 MG: 75 CAPSULE ORAL at 08:16

## 2024-11-21 RX ADMIN — PREGABALIN 75 MG: 75 CAPSULE ORAL at 20:28

## 2024-11-21 RX ADMIN — ENOXAPARIN SODIUM 40 MG: 100 INJECTION SUBCUTANEOUS at 08:15

## 2024-11-21 RX ADMIN — FOLIC ACID 1 MG: 1 TABLET ORAL at 08:16

## 2024-11-21 ASSESSMENT — PAIN SCALES - GENERAL: PAINLEVEL_OUTOF10: 0

## 2024-11-21 NOTE — PROGRESS NOTES
Physical Therapy  Facility/Department: [facility]  Rehabilitation Physical Therapy Treatment Note    NAME: Fernando Abdullahi  : 1995 (29 y.o.)  MRN: [id number]  CODE STATUS: Full Code    Date of Service: 24       Restrictions:  Restrictions/Precautions: Up as Tolerated, Seizure, Fall Risk, General Precautions  Position Activity Restriction  Other position/activity restrictions: telesitter     SUBJECTIVE  Subjective  Subjective: foundin bed  Pain: denies pain          OBJECTIVE  Cognition  Overall Cognitive Status: Exceptions  Arousal/Alertness: Appears intact  Following Commands: Appears intact  Attention Span: Appears intact  Memory: Appears intact  Safety Judgement: Decreased awareness of need for safety  Problem Solving: Assistance required to identify errors made  Insights: Decreased awareness of deficits  Initiation: Appears intact  Sequencing: Appears intact  Orientation  Overall Orientation Status: Within Functional Limits  Orientation Level: Oriented X4    Functional Mobility  Sit to Supine  Assistance Level: Independent  Supine to Sit  Assistance Level: Independent  Sit to Stand  Assistance Level: Independent  Stand to Sit  Assistance Level: Independent      Environmental Mobility  Ambulation  Surface: Level surface  Device:  (no AD)  Distance: 500 ft+180 ft   Assistance Level: Independent  Gait Deviations: Slow rishi;Decreased step length bilateral;Path deviations;Narrow base of support;Unsteady gait;Decreased arm swing bilateral      Neuromuscular Education  Neuromuscular Comments: obstalce course: reciprocal stepping over hudls-> ambulate 8 ft over black cobblestone-> 5 squats on BOSU-> tiight weaves beween 3 foam discs and repeat x 3 reps wiht no AD, supv. no LOB, BUE support for squats. pt picks up activity, 1 minor LOB when bending to  hurdles from ground with SBA to correct (pt self corrects).      PT Exercises  Exercise Treatment: x15 forward , x15 R and x15 L lateral lunge onto bosu

## 2024-11-21 NOTE — PLAN OF CARE
Problem: Discharge Planning  Goal: Discharge to home or other facility with appropriate resources  Outcome: Progressing     Problem: Pain  Goal: Verbalizes/displays adequate comfort level or baseline comfort level  Outcome: Progressing     Problem: Safety - Adult  Goal: Free from fall injury  11/21/2024 0023 by Raquel Melissa, RN  Outcome: Progressing

## 2024-11-21 NOTE — PROGRESS NOTES
Fernando Abdullahi  11/21/2024  [id number]    Chief Complaint: Seizure (HCC)    Subjective:   No acute events overnight. Today Fernando is seen in his room, resting in bed. He reports feeling tired today and endorses difficulty sleeping last night. He otherwise denies acute complaints at this time. Discussed his labs.     Personally reviewed labs.     ROS: denies f/c, n/v, cp, sob    Objective:  Patient Vitals for the past 24 hrs:   BP Temp Temp src Pulse Resp SpO2   11/21/24 1017 112/77 -- -- 87 -- 96 %   11/21/24 0815 115/74 98.7 °F (37.1 °C) Oral 71 16 97 %   11/20/24 2057 114/68 97.9 °F (36.6 °C) Oral 83 16 96 %     Gen: No distress, pleasant. Supine in bed  HEENT: Normocephalic, atraumatic.   CV: extremities well perfused  Resp: No respiratory distress. On room air  Abd: Soft, nondistended  Ext: No edema.   Neuro: Alert, oriented, appropriately interactive. Speech fluent  Psych: appropriate mood and affect     Wt Readings from Last 3 Encounters:   11/18/24 65.2 kg (143 lb 12.8 oz)   11/15/24 67.7 kg (149 lb 3.2 oz)       Laboratory data:   Lab Results   Component Value Date    WBC 6.8 11/21/2024    HGB 12.9 (L) 11/21/2024    HCT 38.7 (L) 11/21/2024    MCV 91.7 11/21/2024     11/21/2024       Lab Results   Component Value Date/Time     11/21/2024 05:47 AM    K 4.0 11/21/2024 05:47 AM     11/21/2024 05:47 AM    CO2 24 11/21/2024 05:47 AM    BUN 13 11/21/2024 05:47 AM    CREATININE 0.6 11/21/2024 05:47 AM    GLUCOSE 91 11/21/2024 05:47 AM    CALCIUM 9.8 11/21/2024 05:47 AM        Therapy progress:  Physical therapy:  Bed Mobility:     Sit>supine:  Assistance Level: Independent  Supine>sit:  Assistance Level: Independent  Transfers:  Additional Factors: Verbal cues  Sit>stand:  Assistance Level: Independent  Skilled Clinical Factors: from EOB, chair and low mat  Stand>sit:  Assistance Level: Independent  Bed<>chair     Stand Pivot:     Lateral transfer:     Car transfer:     Ambulation:  Surface: Level  surface  Device:  (no AD)  Distance: 500 ft  Additional Factors: Verbal cues  Assistance Level: Independent  Gait Deviations: Slow rishi, Decreased step length bilateral, Path deviations, Narrow base of support, Unsteady gait, Decreased arm swing bilateral  Skilled Clinical Factors: improved midline posture/linear paths  Stairs:  Stair Height: 6''  Device: One handrail  Number of Stairs: 12  Assistance Level: Supervision  Skilled Clinical Factors: reciprocal pattern  Curb:     Wheelchair:       Occupational therapy:   Feeding     Grooming/Oral Hygiene  Assistance Level: Modified independent  Skilled Clinical Factors: standing at sink to brush teeth  UE Bathing  Assistance Level: Independent  Skilled Clinical Factors: standing for entirety of task  LE Bathing  Assistance Level: Independent  Skilled Clinical Factors: standing for entirety of task  UE Dressing  Assistance Level: Independent  Skilled Clinical Factors: don/doff shirt  LE Dressing  Assistance Level: Independent  Skilled Clinical Factors: in stance to doff/don underwear, seated to don pants  Putting On/Taking Off Footwear  Assistance Level: Modified independent  Skilled Clinical Factors: don bilat socks  Toileting  Assistance Level: Supervision  Skilled Clinical Factors: standing to urinate at toilet    Speech therapy:    ADULT DIET; Regular  ADULT ORAL NUTRITION SUPPLEMENT; Breakfast, Dinner; Standard High Calorie/High Protein Oral Supplement    Body mass index is 18.97 kg/m².    Assessment and Plan:  Fernando Abdullahi is a 29 year old male with a past medical history significant for alcohol abuse who presented to [facility] on 11/8/24 after having a seizure at home, suspected to be due to alcohol withdrawal. He was admitted to [facility] on 11/15/24 due to functional deficits below his baseline.     Alcohol Withdrawal Seizures  - Lyrica per Neurology  - completed librium wean, discussed with pharmacy  - tad MILLS, has not been triggering  - thiamine,

## 2024-11-21 NOTE — PROGRESS NOTES
[facility]: ACUTE REHAB UNIT  SPEECH-LANGUAGE PATHOLOGY      [x] Daily  [] Weekly Care Conference Note  [] Discharge    Patient:Fernando Abdullahi      :1995  MRN:[id number]  Rehab Dx/Hx: Seizure (HCC) [R56.9]    Precautions: falls  Home situation: lives by self, manages own meds/finances/household tasks, working full time, active    ST Dx: [] Aphasia  [] Dysarthria  [] Apraxia   [] Oropharyngeal dysphagia [x] Cognitive Impairment  [] Other:   Date of Admit: 11/15/2024  Room #: 0154/0154-01    Current functional status (updated daily):         Pt being seen for : [] Speech/Language Treatment  [] Dysphagia Treatment [x] Cognitive Treatment  [] Other:  Communication: [x]WFL  [] Aphasia  [] Dysarthria  [] Apraxia  [] Pragmatic Impairment [] Non-verbal  [] Hearing Loss  [] Other:   Cognition: [] WFL  [x] Mild  [x] Moderate  [] Severe [] Unable to Assess  [] Other:  Memory: [] WFL  [x] Mild  [x] Moderate  [] Severe [] Unable to Assess  [] Other:  Behavior: [x] Alert  [x] Cooperative  [x]  Pleasant  [] Confused  [] Agitated  [] Uncooperative  [] Distractible [] Motivated  [] Self-Limiting [] Anxious  [] Other:  Endurance:  [x] Adequate for participation in SLP sessions  [] Reduced overall  [] Lethargic  [] Other:  Safety: [x] No concerns at this time  [] Reduced insight into deficits  []  Reduced safety awareness [] Not following call light procedures  [] Unable to Assess  [] Other:    Current Diet Order:ADULT DIET; Regular  ADULT ORAL NUTRITION SUPPLEMENT; Breakfast, Dinner; Standard High Calorie/High Protein Oral Supplement  Swallowing Precautions: upright for all intake, stay upright for at least 30 mins after intake, small bites/sips, oral care 2-3x/day to reduce adverse affects in the event of aspiration, increase physical mobility as able, alternate bites/sips, slow rate of intake, STRICT aspiration precautions, and hold PO and contact SLP if s/s of aspiration or worsening respiratory status develop.         Date:

## 2024-11-21 NOTE — PROGRESS NOTES
Occupational Therapy  Facility/Department: [facility]  Rehabilitation Occupational Therapy Daily Treatment Note    Date: 24  Patient Name: Fernando Abdullahi       Room: 0154/0154-01  MRN: [id number]  Account: [id number]   : 1995  (29 y.o.) Gender: male                    Past Medical History:  has no past medical history on file.  Past Surgical History:   has no past surgical history on file.    Restrictions  Restrictions/Precautions: Up as Tolerated, Seizure, Fall Risk, General Precautions  Other position/activity restrictions: telesitter    Subjective  Subjective: Pt in bed, pleasant, no pain, agreeable to OT session, /84, , O2 sats 99% on RA.  Restrictions/Precautions: Up as Tolerated;Seizure;Fall Risk;General Precautions             Objective     Cognition  Overall Cognitive Status: Exceptions  Arousal/Alertness: Appears intact  Following Commands: Appears intact  Attention Span: Appears intact  Memory: Appears intact  Safety Judgement: Decreased awareness of need for safety  Problem Solving: Assistance required to identify errors made  Insights: Decreased awareness of deficits  Initiation: Appears intact  Sequencing: Appears intact  Orientation  Overall Orientation Status: Within Functional Limits         ADL  Putting On/Taking Off Footwear  Assistance Level: Independent  Skilled Clinical Factors: to don B shoes    Instrumental ADL's  Instrumental ADLs: Yes  Meal Prep  Meal Prep Level: Other  Meal Prep Level of Assistance: Independent  Meal Preparation: [place] to make muffins at countertop and cook in oven, Good coordination, problem solving and safety with hot surfaces, Good standing tolerance for ~20 minutes  Light Housekeeping  Light Housekeeping Level: Other  Light Housekeeping Level of Assistance: Independent  Light Housekeeping: Good coordination and balance for washing dishes in stance at sink     Functional Mobility  Device:  (no AD)  Activity: Retrieve items;Transport  items;To/From therapy gym  Assistance Level: Contact guard assist  Skilled Clinical Factors: standing for 20 minutes in kitchen, including walking around atrium through vendor fair, standing on balance board for ~15 minutes for Wii task and standing on flat ground for ~11 minutes for Wii task, CGA/SBA on balance board, independent for all other mobility  Bed Mobility  Overall Assistance Level: Independent  Additional Factors: Head of bed flat  Supine to Sit  Assistance Level: Independent  Scooting  Assistance Level: Independent  Transfers  Surface: To bed;From bed;To chair with arms;From chair with arms  Sit to Stand  Assistance Level: Modified independent  Stand to Sit  Assistance Level: Modified independent  Bed To/From Chair  Technique: Stand step  Assistance Level: Independent   Neuromuscular Education  Neuromuscular education: Yes  Functional Movement Patterns: Pt exhibited fair to poor coordination and reaction speed to complete Wii task with pt placing 8th on 3 races, 12th on 3 races, 10th on 1 race, and 4th on 1 race out of 12 positions.  Response to Techniques: Pt continues to exhibit moderate sway on balance board with CGA for balance for majority of task.  OT Exercises  Resistive Exercises: 8# dowel mary for 20 reps of elbow flexion, forward rows, backward rows, ER/IR, and shoulder horizontal abduction/adduction with BUEs  Motor Control/Coordination: Pt demonstrated good coordination to hit large exercise ball with 8# dowel mary for 3x20 reps with 95% accuracy.     Assessment  Assessment  Assessment: Pt agreeable to OT session. Pt demonstrated improved balance and safety for kitchen tasks with independence and good standing tolerance up to 20 minutes. Pt completed IADLs with independence and good safety with hot surfaces and following directions for meal prep. Pt performed dynamic standing balance task on balance board with CGA for balance due to moderate sway and decreased stability noted at ankles. Pt

## 2024-11-22 PROCEDURE — 1280000000 HC REHAB R&B

## 2024-11-22 PROCEDURE — 97110 THERAPEUTIC EXERCISES: CPT

## 2024-11-22 PROCEDURE — 97129 THER IVNTJ 1ST 15 MIN: CPT

## 2024-11-22 PROCEDURE — 97530 THERAPEUTIC ACTIVITIES: CPT

## 2024-11-22 PROCEDURE — 6360000002 HC RX W HCPCS: Performed by: STUDENT IN AN ORGANIZED HEALTH CARE EDUCATION/TRAINING PROGRAM

## 2024-11-22 PROCEDURE — 97116 GAIT TRAINING THERAPY: CPT

## 2024-11-22 PROCEDURE — 6370000000 HC RX 637 (ALT 250 FOR IP): Performed by: STUDENT IN AN ORGANIZED HEALTH CARE EDUCATION/TRAINING PROGRAM

## 2024-11-22 PROCEDURE — 97130 THER IVNTJ EA ADDL 15 MIN: CPT

## 2024-11-22 PROCEDURE — 97535 SELF CARE MNGMENT TRAINING: CPT

## 2024-11-22 PROCEDURE — 97112 NEUROMUSCULAR REEDUCATION: CPT

## 2024-11-22 RX ORDER — M-VIT,TX,IRON,MINS/CALC/FOLIC 27MG-0.4MG
1 TABLET ORAL DAILY
Qty: 90 TABLET | Refills: 0 | Status: SHIPPED | OUTPATIENT
Start: 2024-11-23

## 2024-11-22 RX ORDER — SODIUM CHLORIDE 9 MG/ML
INJECTION, SOLUTION INTRAVENOUS CONTINUOUS
Status: DISCONTINUED | OUTPATIENT
Start: 2024-11-22 | End: 2024-11-22

## 2024-11-22 RX ORDER — LANOLIN ALCOHOL/MO/W.PET/CERES
100 CREAM (GRAM) TOPICAL DAILY
Qty: 90 TABLET | Refills: 0 | Status: SHIPPED | OUTPATIENT
Start: 2024-11-23

## 2024-11-22 RX ORDER — PREGABALIN 75 MG/1
75 CAPSULE ORAL 2 TIMES DAILY
Qty: 60 CAPSULE | Refills: 2 | Status: SHIPPED | OUTPATIENT
Start: 2024-11-22 | End: 2025-02-20

## 2024-11-22 RX ORDER — FOLIC ACID 1 MG/1
1 TABLET ORAL DAILY
Qty: 90 TABLET | Refills: 0 | Status: SHIPPED | OUTPATIENT
Start: 2024-11-23

## 2024-11-22 RX ADMIN — FOLIC ACID 1 MG: 1 TABLET ORAL at 08:32

## 2024-11-22 RX ADMIN — Medication 1 TABLET: at 08:32

## 2024-11-22 RX ADMIN — ENOXAPARIN SODIUM 40 MG: 100 INJECTION SUBCUTANEOUS at 08:32

## 2024-11-22 RX ADMIN — PREGABALIN 75 MG: 75 CAPSULE ORAL at 08:32

## 2024-11-22 RX ADMIN — PREGABALIN 75 MG: 75 CAPSULE ORAL at 20:44

## 2024-11-22 RX ADMIN — Medication 100 MG: at 08:32

## 2024-11-22 ASSESSMENT — PAIN SCALES - GENERAL: PAINLEVEL_OUTOF10: 0

## 2024-11-22 NOTE — PROGRESS NOTES
ARU IN ROOM ADVANCEMENT of INDEPENDENCE with MOBILITY  [facility]    Patient Name: Fernando Abdullahi        MRN: [id number]    : 1995  (29 y.o.)  Date: 2024     The recommended mobility for Fernando Abdullahi has been updated as of 2024 to reflect the following changes:  [x]  Room independence   []  BED<>CHAIR independence   []  BED<>Bedside Commode independence   [x] Bathroom Privileges   For the duration:  [x] 24 hours   []  Daytime only      With ____no AD______(assistive device/equipment)    Physical Therapy: Lori Martinez PT, DPT endorses the above recommendations based on safety and independence demonstrated during treatment session on 24    Occupational Therapy:  ASIF Boo/L endorses the above recommendations based on safety and independence demonstrated during treatment session on 2024    Speech Therapy: Sabrina Meyer MA [facility] SLP endorses the above recommendations based on safety and independence demonstrated during treatment session on 24    Nursing : Marialuisa Solorio, RN endorses the above recommendations based on safety and independence demonstrated during care on 24    Patient, MD, and treatment team all aware and in agreement.      Signature:    Lori Martinez PT, DPASIF Bonner/Hoffman MA [facility] SLP        Physician Signature: Electronically signed by Reva Gloria MD on 2024 at 3:16 PM

## 2024-11-22 NOTE — DISCHARGE INSTR - DIET

## 2024-11-22 NOTE — PROGRESS NOTES
Occupational Therapy  Facility/Department: [facility]  Rehabilitation Occupational Therapy Daily Treatment Note    Date: 24  Patient Name: Fernando Abdullahi       Room: 0154/0154-01  MRN: [id number]  Account: [id number]   : 1995  (29 y.o.) Gender: male                    Past Medical History:  has no past medical history on file.  Past Surgical History:   has no past surgical history on file.    Restrictions  Restrictions/Precautions: Up as Tolerated, Seizure, Fall Risk, General Precautions  Other position/activity restrictions: telesitter    Subjective  Subjective: Pt in bed, pleasant, no pain, agreeable to OT session and shower, /71, HR 65, O2 sats 100% on RA.  Restrictions/Precautions: Up as Tolerated;Seizure;Fall Risk;General Precautions      Pt was bathed during OT session this date. Pt was Showered with soap/water with Independent.         Objective     Cognition  Overall Cognitive Status: WFL  Arousal/Alertness: Appears intact  Following Commands: Appears intact  Attention Span: Appears intact  Memory: Appears intact  Safety Judgement: Appears intact  Problem Solving: Appears intact  Insights: Appears intact  Initiation: Appears intact  Sequencing: Appears intact  Orientation  Overall Orientation Status: Within Functional Limits         ADL  Feeding  Assistance Level: Independent  Grooming/Oral Hygiene  Assistance Level: Independent  Skilled Clinical Factors: standing at sink to brush teeth  Upper Extremity Bathing  Assistance Level: Independent  Lower Extremity Bathing  Assistance Level: Independent  Upper Extremity Dressing  Assistance Level: Independent  Lower Extremity Dressing  Assistance Level: Independent  Putting On/Taking Off Footwear  Assistance Level: Independent  Toileting  Assistance Level: Independent  Toilet Transfers  Equipment: Standard toilet  Assistance Level: Independent  Tub/Shower Transfers  Type: Shower  Transfer From: Standing without device  Transfer To:  (walk-in shower

## 2024-11-22 NOTE — PROGRESS NOTES
Physical Therapy  Discharge Summary    Name:Fernando Abdullahi  MRN:[id number]  :1995  Treatment Diagnosis: decreased strength, balance, safety awareness.  Diagnosis: seizure    Restrictions/Precautions  Restrictions/Precautions: Up as Tolerated, Seizure, Fall Risk, General Precautions           Position Activity Restriction  Other position/activity restrictions: telesitter     Goals:                  Short Term Goals  Time Frame for Short Term Goals: 1 week ()  Short Term Goal 1: pt will demonstrate MOD IND for bed mobility.- GOAL MET   Short Term Goal 2: pt will demonstrates SBA for functional transfers with LRAD.- GOAL MET   Short Term Goal 3: pt will demonstrate SBA for ambulation up to 150' with LRAD without LOB.- GOAL MET   Short Term Goal 4: pt will ascend and descend 12 steps with SBA and one HR.- GOAL MET             Long Term Goals  Time Frame for Long Term Goals : 2 weeks ()  Long Term Goal 1: pt will demonstrate IND with bed mobility.- GOAL MET  Long Term Goal 2: pt will demonstrate IND with transfers with VS without AD.- GOAL MET  Long Term Goal 3: pt will demonstrate IND with ambulation up to 300' with VS without AD.- GOAL MET  Long Term Goal 4: pt will demonstrate IND to ascend and descend 12 steps without HR.- GOAL MET    Pt. Met 4/4 short term goals and 4/4 long term goals.     Pt. Currently ambulates >200 feet with no AD and ind  Up/down 12 steps with ind  Up/down curb step with ind  Sit to/from stand with ind  Bed mobility with ind  Recommend OPPT in order to if pt feels need to continue to progress higher level balance however pt demos appropriate righting reactions, improved endurance/balance.  Pt. Safe to return home with assistance from family prn.     Electronically signed by Lori Martinez PT on 2024 at 9:48 AM

## 2024-11-22 NOTE — PROGRESS NOTES
Fernando Abdullahi  11/22/2024  [id number]    Chief Complaint: Seizure (HCC)    Subjective:   No acute events overnight. Today Fernando is seen with therapy. He reports feeling well and denies acute complaints at this time. He plans to discharge to inpatient alcohol rehabilitation tomorrow.       ROS: denies f/c, n/v, cp, sob    Objective:  Patient Vitals for the past 24 hrs:   BP Temp Temp src Pulse Resp SpO2   11/22/24 0926 115/80 -- -- 88 -- 97 %   11/22/24 0745 113/71 97.7 °F (36.5 °C) Oral 64 16 100 %   11/21/24 2030 (!) 124/95 97.8 °F (36.6 °C) Axillary 92 16 98 %     Gen: No distress, pleasant.   HEENT: Normocephalic, atraumatic.   CV: extremities well perfused  Resp: No respiratory distress. On room air  Abd: Soft, nondistended  Ext: No edema.   Neuro: Alert, oriented, appropriately interactive. Ambulates in [place] without device, speech fluent  Psych: appropriate mood and affect     Wt Readings from Last 3 Encounters:   11/18/24 65.2 kg (143 lb 12.8 oz)   11/15/24 67.7 kg (149 lb 3.2 oz)       Laboratory data:   Lab Results   Component Value Date    WBC 6.8 11/21/2024    HGB 12.9 (L) 11/21/2024    HCT 38.7 (L) 11/21/2024    MCV 91.7 11/21/2024     11/21/2024       Lab Results   Component Value Date/Time     11/21/2024 05:47 AM    K 4.0 11/21/2024 05:47 AM     11/21/2024 05:47 AM    CO2 24 11/21/2024 05:47 AM    BUN 13 11/21/2024 05:47 AM    CREATININE 0.6 11/21/2024 05:47 AM    GLUCOSE 91 11/21/2024 05:47 AM    CALCIUM 9.8 11/21/2024 05:47 AM        Therapy progress:  Physical therapy:  Bed Mobility:     Sit>supine:  Assistance Level: Independent  Supine>sit:  Assistance Level: Independent  Transfers:  Additional Factors: Verbal cues  Sit>stand:  Assistance Level: Independent  Skilled Clinical Factors: from EOB, chair and low mat  Stand>sit:  Assistance Level: Independent  Bed<>chair     Stand Pivot:  Assistance Level: Independent  Lateral transfer:     Car transfer:  Assistance Level:  Independent  Ambulation:  Surface: Level surface  Device:  (no AD)  Distance: 400 ft + 10 ft uneven surface  Additional Factors: Verbal cues  Assistance Level: Independent  Gait Deviations: Slow rishi, Decreased step length bilateral, Path deviations, Narrow base of support, Unsteady gait, Decreased arm swing bilateral  Skilled Clinical Factors: improved midline posture/linear paths  Stairs:  Stair Height: 6''  Device: One handrail  Number of Stairs: 12  Assistance Level: Independent  Skilled Clinical Factors: reciprocal pattern  Curb:  Curb Height: 6''  Number of Curbs: 2  Assistance Level: Independent  Wheelchair:       Occupational therapy:   Feeding  Assistance Level: Independent  Grooming/Oral Hygiene  Assistance Level: Independent  Skilled Clinical Factors: standing at sink to brush teeth  UE Bathing  Assistance Level: Independent  Skilled Clinical Factors: standing for entirety of task  LE Bathing  Assistance Level: Independent  Skilled Clinical Factors: standing for entirety of task  UE Dressing  Assistance Level: Independent  Skilled Clinical Factors: don/doff shirt  LE Dressing  Assistance Level: Independent  Skilled Clinical Factors: in stance to doff/don underwear, seated to don pants  Putting On/Taking Off Footwear  Assistance Level: Independent  Skilled Clinical Factors: to don B shoes  Toileting  Assistance Level: Independent  Skilled Clinical Factors: standing to urinate at toilet    Speech therapy:    ADULT DIET; Regular  ADULT ORAL NUTRITION SUPPLEMENT; Breakfast, Dinner; Standard High Calorie/High Protein Oral Supplement    Body mass index is 18.97 kg/m².    Assessment and Plan:  Fernando Abdullahi is a 29 year old male with a past medical history significant for alcohol abuse who presented to [facility] on 11/8/24 after having a seizure at home, suspected to be due to alcohol withdrawal. He was admitted to [facility] on 11/15/24 due to functional deficits below his baseline.     Alcohol Withdrawal

## 2024-11-22 NOTE — CARE COORDINATION
Case Management Discharge Summary  [facility] - Acute Rehab Unit    Patient:Fernando Abdullahi     Rehab Dx/Hx: Seizure (HCC) [R56.9]       Today's Date: 11/22/2024    Discharge to:  Home with family   Pre-certification completed:  [] No       [] Yes     [x] N/A   Hospital Exemption Notification (HENS) completed:  [] No       [] Yes     [x] N/A   IMM given:  N/A       New Durable Medical Equipment ordered/agency:  Shower Chair provided through [employer]   Transportation:  Agency used: private car via parents  Ambulance form completed: [] No       [] Yes   [x] N/A       Confirmed discharge plan with:  Patient: [] No       [x] Yes  Family:  [] No       [x] Yes      Name: mother Viktoriya Abdullahi  Contact number: [phone]  Facility/Agency, name:N/A    Phone number for report to facility: N/A  RN, name: Marialuisa RN       Has lack of transportation kept you from medical appointments, meetings, work, or ADL's? [] Yes, it has kept me from medical appointments or from getting my meds  [] Yes, It has kept me from non-medical meetings, appointments, work, or getting things I need  [x] No  [] Pt unable to respond  [] Pt declines to respond     How often do you need to have someone help you when you read instructions, pamphlets, or other written material from your doctor or pharmacy? [x] Never                             [] Always  [] Rarely                            [] Patient declines to respond  [] Sometimes                    [] Patient unable to respond  [] Often       Note: Discharging nurse to complete FERNIE, reconcile AVS, and place final copy with patient's discharge packet. RN to ensure that written prescriptions for  Level II medications are sent with patient to the facility as per protocol.        No further needs from case management    Signature: Donn Matamoros RN       none

## 2024-11-22 NOTE — DISCHARGE INSTR - COC
Continuity of Care Form    Patient Name: Fernando Abdullahi   :  1995  MRN:  [id number]    Admit date:  11/15/2024  Discharge date:  24    Code Status Order: Full Code   Advance Directives:   Advance Care Flowsheet Documentation             Admitting Physician:  Reva Gloria MD  PCP: Slim Kamara MD    Discharging Nurse: david  Discharging Hospital Unit/Room#: 0154/0154-01  Discharging Unit Phone Number: [phone]    Emergency Contact:   Extended Emergency Contact Information  Primary Emergency Contact: Viktoriya Abdullahi  Mobile Phone: [phone]  Relation: Parent  Secondary Emergency Contact: Aleksander Abdullahi  Mobile Phone: [phone]  Relation: Parent    Past Surgical History:  No past surgical history on file.    Immunization History:     There is no immunization history on file for this patient.    Active Problems:  Patient Active Problem List   Diagnosis Code    Seizure ([facility]) R56.9    Alcohol withdrawal syndrome with complication ([facility]) F10.939    Thrombocytopenia ([facility]) D69.6    Elevated LFTs R79.89       Isolation/Infection:   Isolation            No Isolation          Patient Infection Status       None to display            Nurse Assessment:  Last Vital Signs: /80   Pulse 88   Temp 97.7 °F (36.5 °C) (Oral)   Resp 16   Ht 1.854 m (6' 1\")   Wt 65.2 kg (143 lb 12.8 oz)   SpO2 97%   BMI 18.97 kg/m²     Last documented pain score (0-10 scale): Pain Level: 0  Last Weight:   Wt Readings from Last 1 Encounters:   24 65.2 kg (143 lb 12.8 oz)     Mental Status:  oriented, alert, coherent, logical, thought processes intact, and able to concentrate and follow conversation    IV Access:  - None    Nursing Mobility/ADLs:  Walking   Independent  Transfer  Independent  Bathing  Independent  Dressing  Independent  Toileting  Independent  Feeding  Independent  Med Admin  Independent  Med Delivery   whole    Wound Care Documentation and Therapy:        Elimination:  Continence:

## 2024-11-22 NOTE — PROGRESS NOTES
[facility]: ACUTE REHAB UNIT  SPEECH-LANGUAGE PATHOLOGY      [x] Daily  [] Weekly Care Conference Note  [x] Discharge    Patient:Fernando Abdullahi      :1995  MRN:[id number]  Rehab Dx/Hx: Seizure (HCC) [R56.9]    Precautions: falls  Home situation: lives by self, manages own meds/finances/household tasks, working full time, active    ST Dx: [] Aphasia  [] Dysarthria  [] Apraxia   [] Oropharyngeal dysphagia [x] Cognitive Impairment  [] Other:   Date of Admit: 11/15/2024  Room #: 0154/0154-01    Current functional status (updated daily):         Pt being seen for : [] Speech/Language Treatment  [] Dysphagia Treatment [x] Cognitive Treatment  [] Other:  Communication: [x]WFL  [] Aphasia  [] Dysarthria  [] Apraxia  [] Pragmatic Impairment [] Non-verbal  [] Hearing Loss  [] Other:   Cognition: [] WFL  [x] Mild  [x] Moderate  [] Severe [] Unable to Assess  [] Other:  Memory: [] WFL  [x] Mild  [x] Moderate  [] Severe [] Unable to Assess  [] Other:  Behavior: [x] Alert  [x] Cooperative  [x]  Pleasant  [] Confused  [] Agitated  [] Uncooperative  [] Distractible [] Motivated  [] Self-Limiting [] Anxious  [] Other:  Endurance:  [x] Adequate for participation in SLP sessions  [] Reduced overall  [] Lethargic  [] Other:  Safety: [x] No concerns at this time  [] Reduced insight into deficits  []  Reduced safety awareness [] Not following call light procedures  [] Unable to Assess  [] Other:    Current Diet Order:ADULT DIET; Regular  ADULT ORAL NUTRITION SUPPLEMENT; Breakfast, Dinner; Standard High Calorie/High Protein Oral Supplement  Swallowing Precautions: upright for all intake, stay upright for at least 30 mins after intake, small bites/sips, oral care 2-3x/day to reduce adverse affects in the event of aspiration, increase physical mobility as able, alternate bites/sips, slow rate of intake, STRICT aspiration precautions, and hold PO and contact SLP if s/s of aspiration or worsening respiratory status develop.         Date:

## 2024-11-22 NOTE — PROGRESS NOTES
Occupational Therapy  Discharge Summary     Name:Fernando Abdullahi  MRN:[id number]  :1995  Treatment Diagnosis: decreased strength, balance, safety awareness.  Diagnosis: seizure    Restrictions/Precautions  Restrictions/Precautions: Up as Tolerated, Seizure, Fall Risk, General Precautions           Position Activity Restriction  Other position/activity restrictions: telesitter     Goals:   Short Term Goals  Time Frame for Short Term Goals:   Short Term Goal 1: Pt will complete LB dressing CGA. GOAL MET 24 Pt completed LB dressing with SBA.  Short Term Goal 2: Pt will complete room/bathroom mobility using LRAD with CGA and Min verbal cues for safety awareness. GOAL MET 24 Pt completed bathroom mobility with SPV.  Short Term Goal 3: Pt will complete at least 2 grooming tasks while standing with CGA. GOAL MET 24 Pt completed 2 grooming tasks at sink with SPV.  Short Term Goal 4: Pt will complete toileting + toilet transfer CGA. GOAL MET 24 Pt completed toileting and toilet transfer with SPV.  Short Term Goal 5: Pt will complete BUE therex x15 reps each for increased UB strength required for I/ADLs. GOAL MET 24 Pt completed BUE therex for 20 reps.  Long Term Goals  Time Frame for Long Term Goals :   Long Term Goal 1: Pt will complete LB dressing with SPV. GOAL MET 24 Pt completed LB dressing with SPV.  Long Term Goal 2: Pt will complete UB dressing with setup. GOAL MET 24 Pt completed UB dressing with mod I.  Long Term Goal 3: Pt will complete TB bathing with SPV. GOAL MET 24 Pt completed TB bathing with SPV.  Long Term Goal 4: Pt will complete toileting + toilet transfer with SPV. GOAL MET 24 Pt completed toileting and toilet transfer with independence.  Long Term Goal 5: Pt will complete simple I/ADL with SBA. GOAL MET 24 Pt completed IADL task with SPV.  Long Term Goal 6: Pt will complete at least 2 grooming tasks while standing Mod I. GOAL MET  Recommendations: Outpatient OT;Home with assist PRN  OT Equipment Recommendations  Equipment Needed: Yes  Mobility Devices: ADL Assistive Devices  ADL Assistive Devices: Shower Chair without back  Safety Devices  Safety Devices in place: Yes  Type of devices: Patient at risk for falls;Gait belt;Call light within reach;Nurse notified;Left in bed    Equipment Recommendations:  Shower chair without back         Home Exercise Program  Provided Pt with handout for BUE HEP and theraputty. Pt demonstrated understanding of all HEPs.    Signs and Symptoms of Delirium (from CAM)  A. Acute Onset Mental Status Change  Is there evidence of an acute change in mental status from the patient's baseline?  [x] 0. No [] 1. Yes    B. Inattention: Did the patient have difficulty focusing attention, for example being easily distractible or having difficulty keeping track of what was being said?  [x] 0. Behavior not present    [] 1. Behavior continuously present, does not fluctuate   [] 2. Behavior present, fluctuates (comes and goes, changes in severity)    C. Disorganized thinking: Was the patient's thinking disorganized or incoherent (rambling or irrelevant conversation, unclear or illogical flow of ideas, or unpredictable switching from subject to subject)?  [x] 0. Behavior not present    [] 1. Behavior continuously present, does not fluctuate   [] 2. Behavior present, fluctuates (comes and goes, changes in severity)    D. Altered level of consciousness: Did the patient have altered level of consciousness as indicated by any of the following criteria?  Vigilant: startled easily to any sound or touch  Lethargic: repeatedly dozed off when being asked questions, but responded to voice or touch  Stuporous: very difficult to arouse and keep aroused for the interview  Comatose: could not be aroused  [x] 0. Behavior not present    [] 1. Behavior continuously present, does not fluctuate   [] 2. Behavior present, fluctuates (comes and goes,

## 2024-11-22 NOTE — CARE COORDINATION
CM update: Voicemail left for mother Viktoriya to evaluate any last minute questions/concerns she may have prior to patient discharging. Spoke with patient in room and he has no questions/concerns related to discharge tomorrow. Patient understands that a shower chair will be delivered by [employer] through [employer]. I do not anticipate any further needs from case management.    Donn Matamoros RN

## 2024-11-23 VITALS
DIASTOLIC BLOOD PRESSURE: 69 MMHG | SYSTOLIC BLOOD PRESSURE: 103 MMHG | HEIGHT: 73 IN | TEMPERATURE: 98.4 F | HEART RATE: 67 BPM | OXYGEN SATURATION: 96 % | RESPIRATION RATE: 16 BRPM | BODY MASS INDEX: 19.06 KG/M2 | WEIGHT: 143.8 LBS

## 2024-11-23 PROCEDURE — 6370000000 HC RX 637 (ALT 250 FOR IP): Performed by: STUDENT IN AN ORGANIZED HEALTH CARE EDUCATION/TRAINING PROGRAM

## 2024-11-23 RX ADMIN — Medication 1 TABLET: at 09:51

## 2024-11-23 RX ADMIN — FOLIC ACID 1 MG: 1 TABLET ORAL at 09:51

## 2024-11-23 RX ADMIN — PREGABALIN 75 MG: 75 CAPSULE ORAL at 09:51

## 2024-11-23 RX ADMIN — Medication 100 MG: at 09:51

## 2024-11-23 ASSESSMENT — PAIN SCALES - GENERAL: PAINLEVEL_OUTOF10: 0

## 2024-11-23 NOTE — PLAN OF CARE
Problem: Discharge Planning  Goal: Discharge to home or other facility with appropriate resources  Outcome: Progressing  Flowsheets (Taken 11/22/2024 2342)  Discharge to home or other facility with appropriate resources:   Identify barriers to discharge with patient and caregiver   Arrange for needed discharge resources and transportation as appropriate   Identify discharge learning needs (meds, wound care, etc)     Problem: Pain  Goal: Verbalizes/displays adequate comfort level or baseline comfort level  Outcome: Progressing     Problem: Safety - Adult  Goal: Free from fall injury  11/22/2024 2342 by Katelyn Molina, RN  Outcome: Progressing  11/22/2024 1246 by Marialuisa Solorio, RN  Outcome: Progressing

## 2024-11-23 NOTE — PLAN OF CARE
Problem: Discharge Planning  Goal: Discharge to home or other facility with appropriate resources  11/23/2024 1002 by Sin Lopez RN  Outcome: Progressing     Problem: Pain  Goal: Verbalizes/displays adequate comfort level or baseline comfort level  11/23/2024 1002 by Sin Lopez RN  Outcome: Progressing     Problem: Safety - Adult  Goal: Free from fall injury  11/23/2024 1002 by Sin Lopez RN  Outcome: Progressing

## 2024-11-23 NOTE — PROGRESS NOTES
ACUTE REHAB UNIT: DISCHARGE  [facility]    Patient:Fernando Abdullahi     Rehab Dx/Hx: Seizure (HCC) [R56.9]   :1995  MRN:[id number]  Date of Admit: 11/15/2024   Date of Discharge: 2024    Subjective:   Order for patient discharge.  Reviewed discharge summary (AVS) with patient and _parents including medications, physical instructions (safety) and diet. Pt in stable condition.     Reconciled Medication List - Patient:   Medications were reviewed by RN at time of discharge with patient and/or family:  []  Via EMR   []  Via health information exchange  [x]  Verbal (e.g. in person, telephone, video conferencing)  [x]  Paper-based (e.g. fax, copies, printouts)   []  Other Methods (e.g. texting, email, CDs)    Reconciled Medication List - Subsequent provider:   [x] No, current reconciled medication list not provided to the subsequent provider.  [] Yes, current reconciled medication list provided to the subsequent provider.   [] Via EMR    [] Via health information exchange  [] Verbal (e.g. in person, telephone, video conferencing)  [] Paper-based (e.g. fax, copies, printouts)   [] Other Methods (e.g. texting, email, CDs)    Discharge disposition:  Pt was discharged via:  [x]  Wheelchair  []  Stretcher  []  Safe ambulation and use of assistive device  []  Other:     Pt was discharged to:  [x]  Private car  []  Transportation service to next destination  []  Other:     Discharge destination:  [x]  Home  []  Skilled Nursing Facility  []  Long Term Care  []  Other:     Has lack of transportation kept you from medical appointments, meetings, work, or ADL's? [] Yes, it has kept me from medical appointments or from getting my meds  [] Yes, It has kept me from non-medical meetings, appointments, work, or getting things I need  [x] No  [] Pt unable to respond  [] Pt declines to respond     How often do you need to have someone help you when you read instructions, pamphlets, or other written material

## 2024-11-23 NOTE — PROGRESS NOTES
[facility] Discharge Assessment  [facility] Acute Rehab Unit    Patient:Fernando Abdullahi     Rehab Dx/Hx: Seizure (HCC) [R56.9]   :1995  MRN:[id number]  Date of Admit: 11/15/2024     Pain Effect on Sleep:  Over the past 5 days, how much of the time has pain made it hard for you to sleep at night?  [x]  0. Does not apply - I have not had any pain or hurting in the past 5 days  []  1. Rarely or not at all  []  2. Occasionally  []  3. Frequently  []  4. Almost constantly  []  8. Unable to answer    Over the past 5 days, how often have you limited your participation in rehabilitation therapy sessions due to pain?  [x]  0. Does not apply - I have not received rehabilitation therapy in the past 5 days  []  1. Rarely or not at all  []  2. Occasionally  []  3. Frequently  []  4. Almost constantly  []  8. Unable to answer    Pain Interference with Day-to-Day Activities:  Over the past 5 days, how often have you limited your day-to-day activities (excluding rehabilitation therapy session)?  [x]  1. Rarely or not at all  []  2. Occasionally  []  3. Frequently  []  4. Almost constantly  []  8. Unable to answer      High-Risk Drug Classes: Use and Indication   Is taking: Check if the pt is taking any medications by pharmacological classification  Indication noted: If column 1 is checked, check if there is an indication noted for all meds in the drug class Is taking  (check all that apply) Indication noted (check all that apply)   Antipsychotic [] []   Anticoagulant [] []   Antibiotic [] []   Opioid [] []   Antiplatelet [] []   Hypoglycemic (including insulin) [] []   None of the above [x] [x]     Special Treatments, Procedures, and Programs   Check all of the following treatments, procedures, and programs that apply on discharge.  On discharge (check all that apply)   Cancer Treatments    A1. Chemotherapy []   A2. IV []   A3. Oral []   A10. Other []   B1. Radiation []   Respiratory Therapies    C1. Oxygen Therapy []   C2.

## 2024-11-25 NOTE — DISCHARGE SUMMARY
Physical Medicine & Rehabilitation  Discharge Summary     Patient Identification:  Fernando Abdullahi  : 1995  Admit date: 11/15/2024  Discharge date: 2024   Attending provider: Reva Gloria MD       Primary care provider: Slim Kamara MD     Discharge Diagnoses:   Active Hospital Problems    Diagnosis     Seizure (HCC) [R56.9]        History of Present Illness/Acute Hospital Course:  Fernando Abdullahi is a 29 year old male with a past medical history significant for alcohol abuse who presented to [facility] on 24 after having a seizure at home. He had two additional episodes in the ED. CT head was negative for acute process. Neurology was consulted. EEG did not reveal any seizure activity. Hospital course was complicated by agitation requiring precedex. He was started on vimpat and lyrica for short term seizure prevention. He was admitted to [facility] on 11/15/24 due to functional deficits below his baseline. Today Fernando is seen with nursing and his parents present. He reports feeling back to his baseline, but his parents notes that he is still somewhat confused. He reports some numbness on the back of his legs bilaterally. He reports that his last bowel movement was last night. He denies urinary complaints. He lives alone in a single level apartment with no stairs to enter. He is motivated to work with therapies. His parents are hopeful for him to discharge to alcohol rehab. He works at a bank and enjoys reading mysteries.     Inpatient Rehabilitation Course:   Fernando Abdullahi is a 29 y.o. male admitted to inpatient rehabilitation on 11/15/2024 with Seizure (HCC).  The patient participated in an aggressive multidisciplinary inpatient rehabilitation program involving 3 hours of therapy per day, at least 5 days per week. Discharging home with family. DME ordered. Patient will follow-up with PCP.     Impairments: impaired mobility and ADLs, impaired gait and balance    Medical Management:    Alcohol
